# Patient Record
Sex: FEMALE | Race: OTHER
[De-identification: names, ages, dates, MRNs, and addresses within clinical notes are randomized per-mention and may not be internally consistent; named-entity substitution may affect disease eponyms.]

---

## 2019-07-19 ENCOUNTER — HOSPITAL ENCOUNTER (INPATIENT)
Dept: HOSPITAL 72 - EMR | Age: 49
LOS: 6 days | Discharge: HOME | DRG: 139 | End: 2019-07-25
Payer: COMMERCIAL

## 2019-07-19 VITALS — DIASTOLIC BLOOD PRESSURE: 83 MMHG | SYSTOLIC BLOOD PRESSURE: 116 MMHG

## 2019-07-19 VITALS — HEIGHT: 60 IN | BODY MASS INDEX: 27.68 KG/M2 | WEIGHT: 141 LBS

## 2019-07-19 VITALS — SYSTOLIC BLOOD PRESSURE: 125 MMHG | DIASTOLIC BLOOD PRESSURE: 86 MMHG

## 2019-07-19 VITALS — SYSTOLIC BLOOD PRESSURE: 113 MMHG | DIASTOLIC BLOOD PRESSURE: 68 MMHG

## 2019-07-19 DIAGNOSIS — R94.5: ICD-10-CM

## 2019-07-19 DIAGNOSIS — I21.4: ICD-10-CM

## 2019-07-19 DIAGNOSIS — J18.9: Primary | ICD-10-CM

## 2019-07-19 DIAGNOSIS — G81.94: ICD-10-CM

## 2019-07-19 DIAGNOSIS — A08.4: ICD-10-CM

## 2019-07-19 DIAGNOSIS — H54.8: ICD-10-CM

## 2019-07-19 DIAGNOSIS — R74.8: ICD-10-CM

## 2019-07-19 LAB
ADD MANUAL DIFF: NO
ALBUMIN SERPL-MCNC: 3.4 G/DL (ref 3.4–5)
ALBUMIN/GLOB SERPL: 0.6 {RATIO} (ref 1–2.7)
ALP SERPL-CCNC: 134 U/L (ref 46–116)
ALT SERPL-CCNC: 92 U/L (ref 12–78)
ANION GAP SERPL CALC-SCNC: 11 MMOL/L (ref 5–15)
APPEARANCE UR: CLEAR
APTT PPP: YELLOW S
AST SERPL-CCNC: 93 U/L (ref 15–37)
BASOPHILS NFR BLD AUTO: 1.7 % (ref 0–2)
BILIRUB SERPL-MCNC: 0.8 MG/DL (ref 0.2–1)
BUN SERPL-MCNC: 17 MG/DL (ref 7–18)
CALCIUM SERPL-MCNC: 9 MG/DL (ref 8.5–10.1)
CHLORIDE SERPL-SCNC: 104 MMOL/L (ref 98–107)
CK MB SERPL-MCNC: 0.8 NG/ML (ref 0–3.6)
CK SERPL-CCNC: 114 U/L (ref 26–308)
CO2 SERPL-SCNC: 26 MMOL/L (ref 21–32)
CREAT SERPL-MCNC: 0.6 MG/DL (ref 0.55–1.3)
EOSINOPHIL NFR BLD AUTO: 0.5 % (ref 0–3)
ERYTHROCYTE [DISTWIDTH] IN BLOOD BY AUTOMATED COUNT: 10.6 % (ref 11.6–14.8)
GLOBULIN SER-MCNC: 5.3 G/DL
GLUCOSE UR STRIP-MCNC: NEGATIVE MG/DL
HCT VFR BLD CALC: 50.5 % (ref 37–47)
HGB BLD-MCNC: 16.7 G/DL (ref 12–16)
KETONES UR QL STRIP: (no result)
LEUKOCYTE ESTERASE UR QL STRIP: (no result)
LYMPHOCYTES NFR BLD AUTO: 12.6 % (ref 20–45)
MCV RBC AUTO: 90 FL (ref 80–99)
MONOCYTES NFR BLD AUTO: 14.1 % (ref 1–10)
NEUTROPHILS NFR BLD AUTO: 71.1 % (ref 45–75)
NITRITE UR QL STRIP: NEGATIVE
PH UR STRIP: 7 [PH] (ref 4.5–8)
PLATELET # BLD: 287 K/UL (ref 150–450)
POTASSIUM SERPL-SCNC: 3.8 MMOL/L (ref 3.5–5.1)
PROT UR QL STRIP: (no result)
RBC # BLD AUTO: 5.59 M/UL (ref 4.2–5.4)
SODIUM SERPL-SCNC: 141 MMOL/L (ref 136–145)
SP GR UR STRIP: 1.01 (ref 1–1.03)
UROBILINOGEN UR-MCNC: 8 MG/DL (ref 0–1)
WBC # BLD AUTO: 3.9 K/UL (ref 4.8–10.8)

## 2019-07-19 PROCEDURE — 80076 HEPATIC FUNCTION PANEL: CPT

## 2019-07-19 PROCEDURE — 99285 EMERGENCY DEPT VISIT HI MDM: CPT

## 2019-07-19 PROCEDURE — 86703 HIV-1/HIV-2 1 RESULT ANTBDY: CPT

## 2019-07-19 PROCEDURE — 93017 CV STRESS TEST TRACING ONLY: CPT

## 2019-07-19 PROCEDURE — 84484 ASSAY OF TROPONIN QUANT: CPT

## 2019-07-19 PROCEDURE — 81003 URINALYSIS AUTO W/O SCOPE: CPT

## 2019-07-19 PROCEDURE — 96365 THER/PROPH/DIAG IV INF INIT: CPT

## 2019-07-19 PROCEDURE — 85007 BL SMEAR W/DIFF WBC COUNT: CPT

## 2019-07-19 PROCEDURE — 80202 ASSAY OF VANCOMYCIN: CPT

## 2019-07-19 PROCEDURE — 81025 URINE PREGNANCY TEST: CPT

## 2019-07-19 PROCEDURE — 80048 BASIC METABOLIC PNL TOTAL CA: CPT

## 2019-07-19 PROCEDURE — 93306 TTE W/DOPPLER COMPLETE: CPT

## 2019-07-19 PROCEDURE — 94664 DEMO&/EVAL PT USE INHALER: CPT

## 2019-07-19 PROCEDURE — 83690 ASSAY OF LIPASE: CPT

## 2019-07-19 PROCEDURE — 82550 ASSAY OF CK (CPK): CPT

## 2019-07-19 PROCEDURE — 85025 COMPLETE CBC W/AUTO DIFF WBC: CPT

## 2019-07-19 PROCEDURE — 36415 COLL VENOUS BLD VENIPUNCTURE: CPT

## 2019-07-19 PROCEDURE — 96375 TX/PRO/DX INJ NEW DRUG ADDON: CPT

## 2019-07-19 PROCEDURE — 94640 AIRWAY INHALATION TREATMENT: CPT

## 2019-07-19 PROCEDURE — 78452 HT MUSCLE IMAGE SPECT MULT: CPT

## 2019-07-19 PROCEDURE — 80069 RENAL FUNCTION PANEL: CPT

## 2019-07-19 PROCEDURE — 83880 ASSAY OF NATRIURETIC PEPTIDE: CPT

## 2019-07-19 PROCEDURE — 82553 CREATINE MB FRACTION: CPT

## 2019-07-19 PROCEDURE — 80053 COMPREHEN METABOLIC PANEL: CPT

## 2019-07-19 PROCEDURE — 93005 ELECTROCARDIOGRAM TRACING: CPT

## 2019-07-19 PROCEDURE — 71045 X-RAY EXAM CHEST 1 VIEW: CPT

## 2019-07-19 PROCEDURE — 74177 CT ABD & PELVIS W/CONTRAST: CPT

## 2019-07-19 RX ADMIN — SODIUM CHLORIDE SCH MLS/HR: 9 INJECTION, SOLUTION INTRAVENOUS at 21:54

## 2019-07-19 RX ADMIN — HEPARIN SODIUM SCH UNITS: 5000 INJECTION INTRAVENOUS; SUBCUTANEOUS at 21:58

## 2019-07-19 RX ADMIN — SODIUM CHLORIDE SCH MLS/HR: 0.9 INJECTION INTRAVENOUS at 21:55

## 2019-07-19 NOTE — CARDIOLOGY PROGRESS NOTE
Assessment/Plan


Assessment/Plan


The patient is seen and examined, full consult note is dictated.





Objective





Last 24 Hour Vital Signs








  Date Time  Temp Pulse Resp B/P (MAP) Pulse Ox O2 Delivery O2 Flow Rate FiO2


 


7/19/19 18:44 98.0 80 19 109/79 97 Nasal Cannula 1.0 24


 


7/19/19 17:45  95 25  100 Nasal Cannula  24


 


7/19/19 17:34        24


 


7/19/19 17:34  96 24  94 Nasal Cannula 1.0 24


 


7/19/19 17:34  96 24  94 Nasal Cannula 1.0 24


 


7/19/19 16:15 98.1 82 20 116/83 96 Room Air  


 


7/19/19 14:25 98.4 86 13 113/68 97 Room Air  


 


7/19/19 14:24  84 22   Room Air  


 


7/19/19 14:15 98.1 85 18 105/80 (88) 98 Room Air  











Laboratory Tests








Test


  7/19/19


14:46


 


White Blood Count


  3.9 K/UL


(4.8-10.8)  L


 


Red Blood Count


  5.59 M/UL


(4.20-5.40)  H


 


Hemoglobin


  16.7 G/DL


(12.0-16.0)  H


 


Hematocrit


  50.5 %


(37.0-47.0)  H


 


Mean Corpuscular Volume 90 FL (80-99)  


 


Mean Corpuscular Hemoglobin


  29.9 PG


(27.0-31.0)


 


Mean Corpuscular Hemoglobin


Concent 33.0 G/DL


(32.0-36.0)


 


Red Cell Distribution Width


  10.6 %


(11.6-14.8)  L


 


Platelet Count


  287 K/UL


(150-450)


 


Mean Platelet Volume


  7.0 FL


(6.5-10.1)


 


Neutrophils (%) (Auto)


  71.1 %


(45.0-75.0)


 


Lymphocytes (%) (Auto)


  12.6 %


(20.0-45.0)  L


 


Monocytes (%) (Auto)


  14.1 %


(1.0-10.0)  H


 


Eosinophils (%) (Auto)


  0.5 %


(0.0-3.0)


 


Basophils (%) (Auto)


  1.7 %


(0.0-2.0)


 


Urine Color Yellow  


 


Urine Appearance Clear  


 


Urine pH 7 (4.5-8.0)  


 


Urine Specific Gravity


  1.015


(1.005-1.035)


 


Urine Protein


  2+ (NEGATIVE)


H


 


Urine Glucose (UA)


  Negative


(NEGATIVE)


 


Urine Ketones


  3+ (NEGATIVE)


H


 


Urine Blood


  1+ (NEGATIVE)


H


 


Urine Nitrite


  Negative


(NEGATIVE)


 


Urine Bilirubin


  Negative


(NEGATIVE)


 


Urine Urobilinogen


  8 MG/DL


(0.0-1.0)  H


 


Urine Leukocyte Esterase


  1+ (NEGATIVE)


H


 


Urine RBC


  0-2 /HPF (0 -


2)


 


Urine WBC


  0-2 /HPF (0 -


2)


 


Urine Squamous Epithelial


Cells None /LPF


(NONE/OCC)


 


Urine Bacteria


  None /HPF


(NONE)


 


Urine HCG, Qualitative


  Negative


(NEGATIVE)


 


Sodium Level


  141 MMOL/L


(136-145)


 


Potassium Level


  3.8 MMOL/L


(3.5-5.1)


 


Chloride Level


  104 MMOL/L


()


 


Carbon Dioxide Level


  26 MMOL/L


(21-32)


 


Anion Gap


  11 mmol/L


(5-15)


 


Blood Urea Nitrogen


  17 mg/dL


(7-18)


 


Creatinine


  0.6 MG/DL


(0.55-1.30)


 


Estimat Glomerular Filtration


Rate > 60 mL/min


(>60)


 


Glucose Level


  103 MG/DL


()


 


Calcium Level


  9.0 MG/DL


(8.5-10.1)


 


Total Bilirubin


  0.8 MG/DL


(0.2-1.0)


 


Aspartate Amino Transf


(AST/SGOT) 93 U/L (15-37)


H


 


Alanine Aminotransferase


(ALT/SGPT) 92 U/L (12-78)


H


 


Alkaline Phosphatase


  134 U/L


()  H


 


Total Creatine Kinase


  114 U/L


()


 


Creatine Kinase MB


  0.8 NG/ML


(0.0-3.6)


 


Creatine Kinase MB Relative


Index 0.7  


 


 


Troponin I


  0.338 ng/mL


(0.000-0.056)


 


Pro-B-Type Natriuretic Peptide


  122 pg/mL


(0-125)


 


Total Protein


  8.7 G/DL


(6.4-8.2)  H


 


Albumin


  3.4 G/DL


(3.4-5.0)


 


Globulin 5.3 g/dL  


 


Albumin/Globulin Ratio


  0.6 (1.0-2.7)


L


 


Lipase


  89 U/L


()

















Asad Campoverde MD Jul 19, 2019 20:14

## 2019-07-19 NOTE — NUR
ED Nurse Note: 

ERMD notified that patient c/o hard time breathing at this time. Pulse oximetry 
reading > 94% with RR21 with HR 80. Reports no chest pain. Able to speak full 
sentence. ERMD ordered to provide  oxygen via N/C and placed patient in 
semi-mayen position.

## 2019-07-19 NOTE — NUR
CASE MANAGEMENT: REVIEW



49Y/F BIBA FROM HOME 



CC: ABD PAIN . N/V/D . SOB . COUGH





SI: ELEVATED TROPONIN 

T 98.0 HR 95 RR 25 /79 SAT 98% ROOM AIR

WBC 3.9  AST 93 ALT 92 ALK PHOS 134 TROPONIN I 0.338 





IS: NS IVF BOLUS X1

CEFTRIAXONE IV X1

MORPHINE IV X1

ASA PO X1

LASIX IV X1 





***PATIENT ADMITTED TO TELEMETRY UNIT 07/19/2019***

DCP: PATIENT IS FROM HOME

## 2019-07-19 NOTE — DIAGNOSTIC IMAGING REPORT
Clinical Indication: Abdominal pain

 

Technique:   No oral contrast utilized, per emergency room physician request  IV

administration nonionic contrast. Venous phase spiral acquisition obtained through

the abdomen and pelvis. Multiplanar reconstructions were generated. Total dose length

product 1045.46 mGycm. CTDIvol(s) 18.84 mGy. Dose reduction achieved using automated

exposure control

 

 

Comparison: none

 

Findings: Like of enteric contrast limits assessment of the GI tract. The appendix is

normal. There is no evidence of diverticulosis or diverticulitis. No small bowel

distention. No free or loculated intraperitoneal gas or fluid is evident. The distal

esophagus, stomach, duodenum are unremarkable.

 

The liver, gallbladder, bile ducts, pancreas, spleen, adrenals are unremarkable.

There is an accessory splenule. There is a subcentimeter low-attenuation lesion in

the upper pole of the left kidney which is too small to characterize, most likely

benign simple cyst or possibly an angiomyolipoma. Other very tiny scattered

subcentimeter low-attenuation lesions are also seen bilaterally. No renal or ureteral

calculi, hydronephrosis, or hydroureter. Uterus and ovaries are unremarkable,

although there may be a collapsed follicle in the left ovary. The endometrial stripe

is somewhat thickened, measuring 11 mm thick, and there is evidence of fluid in the

endometrium No pelvic mass or adenopathy. No retroperitoneal or mesenteric mass or

adenopathy.

 

The lung bases demonstrate scattered patchy parenchymal opacities bilaterally. The

bones demonstrate thoracolumbar dextroscoliotic deformity. There is a slight superior

endplate depression of the L1 vertebral body.

 

Impression: The visualized lung bases to the scattered patchy pulmonary parenchymal

infiltrates, most likely pneumonia

 

Limited assessment of the GI tract, due to lack of enteric contrast administration

 

No definite acute abdominal or pelvic process

 

Nonspecific mild endometrial thickening and fluid. Consider pelvic sonographic

correlation as clinically indicated

 

Possible collapsed left ovarian follicle

 

Mild superior endplate depression of the L1 vertebral body. Age indeterminate.

Consider MRI for better characterization if clinically relevant

 

Subcentimeter low-attenuation renal lesions, too small to characterize, most likely

benign simple cortical cysts. No further follow-up necessary.

 

Incidental findings of thoracolumbar dextroscoliotic deformity, accessory splenule.

 

The CT scanner at [facility] is accredited by the [employer] College of

[employer]iology and the scans are performed using protocols designed to limit radiation

exposure to as low as reasonably achievable to attain images of sufficient resolution

adequate for diagnostic evaluation.

## 2019-07-19 NOTE — NUR
ED Nurse Note:

PATIENT PRESENTS TO DUE TO ABDOMINAL PAIN, CHILLS, N/V AND LOOSE STOOL X 2 
DAYS; PATIENT STATES 'PAIN WAS ALL OVER THE ABDOMEN'; PATIENT HAS REDNESS WITH 
SCALY SKIN ON UPPER BACK, CHEST. PER PATIENT, SHE WAS SEEN BY MD DUE TO 
ALLERGIC REACTION 2 DAYS AGO. PATIENT HAS SCALY SKIN OVER RIGHT HAND. PATIENT 
WAS UNABLE TO TOLERATE ORAL FLUIDS DUE TO FEELING NAUSEOUS AND VOMITIED AFTER 
DRINKING FLUIDS OR EATING FOOD. PATIENT AMBULATES WITH CANE AT HOME.

## 2019-07-19 NOTE — EMERGENCY ROOM REPORT
History of Present Illness


General


Chief Complaint:  Abdominal Pain


Source:  Patient





Present Illness


HPI


Patient presents with complaints of diffuse abdominal pain


Diarrhea was started 2 days ago


Denies any headache denies any chest pain patient is legally blind has left-

sided paralysis





Denies any chest pain or shortness of breath abdominal pains 5 out of 10 sharp 

denies any change of position denies any blood in the stool


Allergies:  


Coded Allergies:  


     No Known Allergies (Unverified , 7/19/19)





Patient History


Past Medical History:  see triage record


Pertinent Family History:  none


Last Menstrual Period:  06/13/2019


Reviewed Nursing Documentation:  PMH: Agreed; PSxH: Agreed





Nursing Documentation-PMH


Past Medical History:  No History, Except For





Review of Systems


All Other Systems:  negative except mentioned in HPI





Physical Exam





Vital Signs








  Date Time  Temp Pulse Resp B/P (MAP) Pulse Ox O2 Delivery O2 Flow Rate FiO2


 


7/19/19 14:15 98.1 85 18 105/80 (88) 98 Room Air  








Sp02 EP Interpretation:  reviewed, normal


General Appearance:  well appearing, no apparent distress


Head:  normocephalic, atraumatic


ENT:  normal pharynx


Neck:  supple


Respiratory:  lungs clear, no retraction, no accessory muscle use


Cardiovascular #1:  regular rate, rhythm


Gastrointestinal:  other - Patient is uncomfortable diffusely however no 

obvious rebound soft abdomen


Musculoskeletal:  other - Patient debilitated with left hand flexure bilateral 

feet and extension


Neurologic:  alert, oriented x3


Skin:  other - Rash involving the upper chest bilaterally patient reports that 

this is reaction to mustard


Lymphatic:  no adenopathy





Medical Decision Making


Diagnostic Impression:  


 Primary Impression:  


 Abdominal pain


 Additional Impression:  


 Elevated troponin


ER Course


With the history exam and presentation, multiple differentials considered, 

including but not limited to appendicitis, gastritis, cholecystitis, 

diverticulitis





During the patient's work-up and examination cardiac enzyme has returned mildly 

elevated patient CT imaging of the abdomen also shows incidental finding of 

bilateral lower lobe atelectasis





Further question regarding cardiac and cardiopulmonary pathology was also 

entertained at this time, patient received further intervention in the ER





And requires inpatient care





Labs








Test


  7/19/19


14:46 7/19/19


23:00 7/20/19


05:52 7/21/19


09:57


 


White Blood Count


  3.9 K/UL


(4.8-10.8) 


  3.3 K/UL


(4.8-10.8) 3.7 K/UL


(4.8-10.8)


 


Red Blood Count


  5.59 M/UL


(4.20-5.40) 


  4.78 M/UL


(4.20-5.40) 4.86 M/UL


(4.20-5.40)


 


Hemoglobin


  16.7 G/DL


(12.0-16.0) 


  14.6 G/DL


(12.0-16.0) 14.4 G/DL


(12.0-16.0)


 


Hematocrit


  50.5 %


(37.0-47.0) 


  43.2 %


(37.0-47.0) 43.6 %


(37.0-47.0)


 


Mean Corpuscular Volume 90 FL (80-99)   90 FL (80-99)  90 FL (80-99) 


 


Mean Corpuscular Hemoglobin


  29.9 PG


(27.0-31.0) 


  30.6 PG


(27.0-31.0) 29.7 PG


(27.0-31.0)


 


Mean Corpuscular Hemoglobin


Concent 33.0 G/DL


(32.0-36.0) 


  33.8 G/DL


(32.0-36.0) 33.1 G/DL


(32.0-36.0)


 


Red Cell Distribution Width


  10.6 %


(11.6-14.8) 


  10.5 %


(11.6-14.8) 10.5 %


(11.6-14.8)


 


Platelet Count


  287 K/UL


(150-450) 


  250 K/UL


(150-450) 257 K/UL


(150-450)


 


Mean Platelet Volume


  7.0 FL


(6.5-10.1) 


  6.4 FL


(6.5-10.1) 6.5 FL


(6.5-10.1)


 


Neutrophils (%) (Auto)


  71.1 %


(45.0-75.0) 


   % (45.0-75.0) 


   % (45.0-75.0) 


 


 


Lymphocytes (%) (Auto)


  12.6 %


(20.0-45.0) 


   % (20.0-45.0) 


   % (20.0-45.0) 


 


 


Monocytes (%) (Auto)


  14.1 %


(1.0-10.0) 


   % (1.0-10.0) 


   % (1.0-10.0) 


 


 


Eosinophils (%) (Auto)


  0.5 %


(0.0-3.0) 


   % (0.0-3.0) 


   % (0.0-3.0) 


 


 


Basophils (%) (Auto)


  1.7 %


(0.0-2.0) 


   % (0.0-2.0) 


   % (0.0-2.0) 


 


 


Urine Color Yellow    


 


Urine Appearance Clear    


 


Urine pH 7 (4.5-8.0)    


 


Urine Specific Gravity


  1.015


(1.005-1.035) 


  


  


 


 


Urine Protein 2+ (NEGATIVE)    


 


Urine Glucose (UA)


  Negative


(NEGATIVE) 


  


  


 


 


Urine Ketones 3+ (NEGATIVE)    


 


Urine Blood 1+ (NEGATIVE)    


 


Urine Nitrite


  Negative


(NEGATIVE) 


  


  


 


 


Urine Bilirubin


  Negative


(NEGATIVE) 


  


  


 


 


Urine Urobilinogen


  8 MG/DL


(0.0-1.0) 


  


  


 


 


Urine Leukocyte Esterase 1+ (NEGATIVE)    


 


Urine RBC


  0-2 /HPF (0 -


2) 


  


  


 


 


Urine WBC


  0-2 /HPF (0 -


2) 


  


  


 


 


Urine Squamous Epithelial


Cells None /LPF


(NONE/OCC) 


  


  


 


 


Urine Bacteria


  None /HPF


(NONE) 


  


  


 


 


Urine HCG, Qualitative


  Negative


(NEGATIVE) 


  


  


 


 


Sodium Level


  141 MMOL/L


(136-145) 


  139 MMOL/L


(136-145) 137 MMOL/L


(136-145)


 


Potassium Level


  3.8 MMOL/L


(3.5-5.1) 


  3.1 MMOL/L


(3.5-5.1) 3.5 MMOL/L


(3.5-5.1)


 


Chloride Level


  104 MMOL/L


() 


  103 MMOL/L


() 103 MMOL/L


()


 


Carbon Dioxide Level


  26 MMOL/L


(21-32) 


  25 MMOL/L


(21-32) 25 MMOL/L


(21-32)


 


Anion Gap


  11 mmol/L


(5-15) 


  11 mmol/L


(5-15) 9 mmol/L


(5-15)


 


Blood Urea Nitrogen


  17 mg/dL


(7-18) 


  17 mg/dL


(7-18) 5 mg/dL (7-18) 


 


 


Creatinine


  0.6 MG/DL


(0.55-1.30) 


  0.6 MG/DL


(0.55-1.30) 0.5 MG/DL


(0.55-1.30)


 


Estimat Glomerular Filtration


Rate > 60 mL/min


(>60) 


  > 60 mL/min


(>60) > 60 mL/min


(>60)


 


Glucose Level


  103 MG/DL


() 


  97 MG/DL


() 100 MG/DL


()


 


Calcium Level


  9.0 MG/DL


(8.5-10.1) 


  8.4 MG/DL


(8.5-10.1) 8.3 MG/DL


(8.5-10.1)


 


Total Bilirubin


  0.8 MG/DL


(0.2-1.0) 


  


  


 


 


Aspartate Amino Transf


(AST/SGOT) 93 U/L (15-37) 


  


  


  


 


 


Alanine Aminotransferase


(ALT/SGPT) 92 U/L (12-78) 


  


  


  


 


 


Alkaline Phosphatase


  134 U/L


() 


  


  


 


 


Total Creatine Kinase


  114 U/L


() 


  


  


 


 


Creatine Kinase MB


  0.8 NG/ML


(0.0-3.6) 


  


  


 


 


Creatine Kinase MB Relative


Index 0.7 


  


  


  


 


 


Troponin I


  0.338 ng/mL


(0.000-0.056) 0.520 ng/mL


(0.000-0.056) 


  0.727 ng/mL


(0.000-0.056)


 


Pro-B-Type Natriuretic Peptide


  122 pg/mL


(0-125) 


  


  


 


 


Total Protein


  8.7 G/DL


(6.4-8.2) 


  


  


 


 


Albumin


  3.4 G/DL


(3.4-5.0) 


  3.2 G/DL


(3.4-5.0) 


 


 


Globulin 5.3 g/dL    


 


Albumin/Globulin Ratio 0.6 (1.0-2.7)    


 


Lipase


  89 U/L


() 


  


  


 


 


Differential Total Cells


Counted 


  


  100 


  100 


 


 


Neutrophils % (Manual)   64 % (45-75)  75 % (45-75) 


 


Lymphocytes % (Manual)   15 % (20-45)  12 % (20-45) 


 


Monocytes % (Manual)   19 % (1-10)  13 % (1-10) 


 


Eosinophils % (Manual)   1 % (0-3)  0 % (0-3) 


 


Basophils % (Manual)   1 % (0-2)  0 % (0-2) 


 


Band Neutrophils   0 % (0-8)  0 % (0-8) 


 


Platelet Estimate   Adequate  Adequate 


 


Platelet Morphology   Normal  Normal 


 


Red Blood Cell Morphology   Normal  Normal 


 


Phosphorus Level


  


  


  3.3 MG/DL


(2.5-4.9) 


 


 


Vancomycin Level Trough


  


  


  


  5.7 ug/mL


(5.0-12.0)


 


HIV (1&2) Antibody Rapid


  


  


  


  Negative


(NEGATIVE)


 


Test


  7/22/19


04:35 


  


  


 


 


White Blood Count


  3.7 K/UL


(4.8-10.8) 


  


  


 


 


Red Blood Count


  4.85 M/UL


(4.20-5.40) 


  


  


 


 


Hemoglobin


  14.7 G/DL


(12.0-16.0) 


  


  


 


 


Hematocrit


  43.4 %


(37.0-47.0) 


  


  


 


 


Mean Corpuscular Volume 89 FL (80-99)    


 


Mean Corpuscular Hemoglobin


  30.3 PG


(27.0-31.0) 


  


  


 


 


Mean Corpuscular Hemoglobin


Concent 33.9 G/DL


(32.0-36.0) 


  


  


 


 


Red Cell Distribution Width


  10.1 %


(11.6-14.8) 


  


  


 


 


Platelet Count


  257 K/UL


(150-450) 


  


  


 


 


Mean Platelet Volume


  7.7 FL


(6.5-10.1) 


  


  


 


 


Neutrophils (%) (Auto)


  63.8 %


(45.0-75.0) 


  


  


 


 


Lymphocytes (%) (Auto)


  15.6 %


(20.0-45.0) 


  


  


 


 


Monocytes (%) (Auto)


  18.4 %


(1.0-10.0) 


  


  


 


 


Eosinophils (%) (Auto)


  1.4 %


(0.0-3.0) 


  


  


 


 


Basophils (%) (Auto)


  0.8 %


(0.0-2.0) 


  


  


 


 


Sodium Level


  138 MMOL/L


(136-145) 


  


  


 


 


Potassium Level


  3.2 MMOL/L


(3.5-5.1) 


  


  


 


 


Chloride Level


  103 MMOL/L


() 


  


  


 


 


Carbon Dioxide Level


  25 MMOL/L


(21-32) 


  


  


 


 


Anion Gap


  10 mmol/L


(5-15) 


  


  


 


 


Blood Urea Nitrogen 7 mg/dL (7-18)    


 


Creatinine


  0.6 MG/DL


(0.55-1.30) 


  


  


 


 


Estimat Glomerular Filtration


Rate > 60 mL/min


(>60) 


  


  


 


 


Glucose Level


  102 MG/DL


() 


  


  


 


 


Calcium Level


  8.6 MG/DL


(8.5-10.1) 


  


  


 








Rhythm Strip Diag. Results


EP Interpretation:  yes


Rate:  88


Rhythm:  NSR, no PVC's, no ectopy





Chest X-Ray Diagnostic Results


Chest X-Ray Diagnostic Results :  


   Chest X-Ray Ordered:  Yes


   # of Views/Limited/Complete:  1 View


   Indication:  Chest Pain


   EP Interpretation:  Yes


   Interpretation:  no consolidation, no effusion, no pneumothorax


   Impression:  No acute disease


   Electronically Signed by:  Vinita Sanchez DO





CT/MRI/US Diagnostic Results


CT/MRI/US Diagnostic Results :  


   Impression


CT abdomen pelvisImpression: The visualized lung bases to the scattered patchy 

pulmonary parenchymal


infiltrates, most likely pneumonia


 


Limited assessment of the GI tract, due to lack of enteric contrast 

administration


 


No definite acute abdominal or pelvic process


 


Nonspecific mild endometrial thickening and fluid. Consider pelvic sonographic


correlation as clinically indicated


 


Possible collapsed left ovarian follicle


 


Mild superior endplate depression of the L1 vertebral body. Age indeterminate.


Consider MRI for better characterization if clinically relevant


 


Subcentimeter low-attenuation renal lesions, too small to characterize, most 

likely


benign simple cortical cysts. No further follow-up necessary.


 


Incidental findings of thoracolumbar dextroscoliotic deformity, accessory 

splenule.





Last Vital Signs








  Date Time  Temp Pulse Resp B/P (MAP) Pulse Ox O2 Delivery O2 Flow Rate FiO2


 


7/19/19 14:25 98.4 86 13 113/68 97 Room Air  








Status:  improved


Disposition:  ADMITTED AS INPATIENT


Condition:  Serious


Referrals:  


NON PHYSICIAN (PCP)











Vinita Sanchez DO Jul 19, 2019 15:54

## 2019-07-19 NOTE — NUR
ED Nurse Note:

Patient taken down for CT scan. RN read the consent form and patient verbalized 
understanding of it and signed the consent.

## 2019-07-19 NOTE — CONSULTATION
DATE OF CONSULTATION:  07/19/2019

CARDIOLOGY CONSULTATION



CONSULTING PHYSICIAN:  Asad Campoverde M.D.



REFERRING PHYSICIAN:  Edwin Collins D.O.



REASON FOR CONSULTATION:  Management of elevated troponin I level.



HISTORY OF PRESENT ILLNESS:  The patient is a very unfortunate 49-year-old

female, who was recently discharged from an outside hospital for food

poisoning, presents today to this facility with diffuse abdominal pain

with associated diarrhea, severe nausea, and inability to tolerate p.o.



The patient is legally blind and has left-sided paralysis.



 She also states that she has trouble with chest pain.  At the time of

arrival to this facility, blood pressure was 105/88 and pulse was 85.

Laboratory finding revealed evidence of leukopenia as well as elevated

troponin I level at 0.033.  Both AST and ALT were elevated at 93 and 92.

The patient was admitted to telemetry for further evaluation and

management.  Cardiology consultation was made at request of Dr. Edwin Collins.



PAST MEDICAL HISTORY:

1. Blindness, bilateral.

2.  Left upper extremity paralysis.



ALLERGIES:  No known drug allergies.



FAMILY HISTORY:  No premature coronary artery disease in first-degree

relatives.



PAST SURGICAL HISTORY:  None.



SOCIAL HISTORY:  Denies any tobacco, alcohol, or illicit drug use.



REVIEW OF SYSTEMS:  A 12-system review done essentially negative except

what was mentioned in history of present illness.



MEDICATIONS:  List of medication includes loratadine 5 mg p.o.

daily.



PHYSICAL EXAMINATION:  

Vital signs: /80, pulse of 85, respirations 18, temperature 98.1 degrees 
Fahrenheit, and O2 saturation

98% on room air.

HEENT: Atraumatic, anicteric, PERRLA, EOMI, B/L Blindness

NECK: JVP <5 cm, No carotid bruit, carotid upstroke 2+ B/L

CVS: Normal S1S2, No murmurs, gallops or rubs, RRR

LUNGS: Clear to auscultation.

ABDOMEN: Soft non-tender, non-distended, no hepatosplenomegaly, +BS

EXT: No evidence od edema, clubbing or cyanosis.



ASSESSMENT AND PLAN:  The patient is a very unfortunate 49-year-old female,

who seen in Cardiology consultation.



1. Elevated troponin I level in this patient.  I would like to order a

12-lead electrocardiogram to review the rhythm and possible ST and T-wave

changes.  The chart does not contain 12-lead electrocardiogram and

computer does not provide 12-lead electrocardiogram, either.  We will

obtain 2D echocardiography for assessment of LV systolic and diastolic

function.

2. I ordered another troponin I level stat to see the trend of the

troponin I level.  The patient is currently complaining of abdominal pain,

nausea, and vomiting.

3.  Legally blind.

4. History of food poisoning.

5. History of left upper extremity paralysis.



I would like to thank Dr. Collins for allowing me to participate in care

of this patient.









  ______________________________________________

  Aasd Campoverde M.D.





DR:  LILY

D:  07/19/2019 20:31

T:  07/19/2019 20:50

JOB#:  [id number]

CC:



ALIYA

## 2019-07-19 NOTE — NUR
NURSE NOTES: Report received from GLORY Matthews. Pt is in stable condition resting comfortably 
in bed. Bed in the lowest position, bed brakes engaged, side rails up x3 and call light 
within reach. Will continue to monitor.

## 2019-07-19 NOTE — DIAGNOSTIC IMAGING REPORT
Indication: Shortness of breath

 

Technique: One view of the chest

 

Comparison: none

 

Findings: Body habitus somewhat somewhat limits evaluation. Hazy opacity of the left

lung base is probably due to overlying soft tissues. No definite acute infiltrates,

effusions, or congestion. Heart size is normal

 

Impression: No definite acute process

## 2019-07-19 NOTE — NUR
HAND-OFF: 

Report received from Adebayo gordon RN and report given to Kayleigh HOLDER. Patient is transferred 
from ER to [facility]2 on a gurney without any incident. Vital signs are as follows: BP: 
104/82, HR: 82 on SR, O2: 98% 2L NC, T: 97.9. Patient is not complaining of pain at this 
time. Cardiac monitor initiated and patient is on SR. Belongings list checked and signed 
with ER RN. Bed is in lowest position with side rails upx2 and brakes are engaged. Bed alarm 
is on. Encouraged patient to use call light when in need of assistance, pt verbalized 
understanding. Will endorse plan of care.

## 2019-07-20 VITALS — SYSTOLIC BLOOD PRESSURE: 105 MMHG | DIASTOLIC BLOOD PRESSURE: 77 MMHG

## 2019-07-20 VITALS — DIASTOLIC BLOOD PRESSURE: 71 MMHG | SYSTOLIC BLOOD PRESSURE: 113 MMHG

## 2019-07-20 VITALS — DIASTOLIC BLOOD PRESSURE: 79 MMHG | SYSTOLIC BLOOD PRESSURE: 117 MMHG

## 2019-07-20 VITALS — SYSTOLIC BLOOD PRESSURE: 114 MMHG | DIASTOLIC BLOOD PRESSURE: 94 MMHG

## 2019-07-20 VITALS — DIASTOLIC BLOOD PRESSURE: 71 MMHG | SYSTOLIC BLOOD PRESSURE: 102 MMHG

## 2019-07-20 VITALS — SYSTOLIC BLOOD PRESSURE: 138 MMHG | DIASTOLIC BLOOD PRESSURE: 75 MMHG

## 2019-07-20 LAB
ADD MANUAL DIFF: YES
ALBUMIN SERPL-MCNC: 3.2 G/DL (ref 3.4–5)
ANION GAP SERPL CALC-SCNC: 11 MMOL/L (ref 5–15)
BUN SERPL-MCNC: 17 MG/DL (ref 7–18)
CALCIUM SERPL-MCNC: 8.4 MG/DL (ref 8.5–10.1)
CHLORIDE SERPL-SCNC: 103 MMOL/L (ref 98–107)
CO2 SERPL-SCNC: 25 MMOL/L (ref 21–32)
CREAT SERPL-MCNC: 0.6 MG/DL (ref 0.55–1.3)
ERYTHROCYTE [DISTWIDTH] IN BLOOD BY AUTOMATED COUNT: 10.5 % (ref 11.6–14.8)
HCT VFR BLD CALC: 43.2 % (ref 37–47)
HGB BLD-MCNC: 14.6 G/DL (ref 12–16)
MCV RBC AUTO: 90 FL (ref 80–99)
PHOSPHATE SERPL-MCNC: 3.3 MG/DL (ref 2.5–4.9)
PLATELET # BLD: 250 K/UL (ref 150–450)
POTASSIUM SERPL-SCNC: 3.1 MMOL/L (ref 3.5–5.1)
RBC # BLD AUTO: 4.78 M/UL (ref 4.2–5.4)
SODIUM SERPL-SCNC: 139 MMOL/L (ref 136–145)
WBC # BLD AUTO: 3.3 K/UL (ref 4.8–10.8)

## 2019-07-20 RX ADMIN — SODIUM CHLORIDE SCH MLS/HR: 9 INJECTION, SOLUTION INTRAVENOUS at 10:17

## 2019-07-20 RX ADMIN — SODIUM CHLORIDE SCH MLS/HR: 900 INJECTION, SOLUTION INTRAVENOUS at 15:46

## 2019-07-20 RX ADMIN — HEPARIN SODIUM SCH UNITS: 5000 INJECTION INTRAVENOUS; SUBCUTANEOUS at 20:14

## 2019-07-20 RX ADMIN — SODIUM CHLORIDE SCH MLS/HR: 9 INJECTION, SOLUTION INTRAVENOUS at 21:06

## 2019-07-20 RX ADMIN — SODIUM CHLORIDE SCH MLS/HR: 0.9 INJECTION INTRAVENOUS at 20:13

## 2019-07-20 RX ADMIN — SODIUM CHLORIDE SCH MLS/HR: 900 INJECTION, SOLUTION INTRAVENOUS at 12:03

## 2019-07-20 RX ADMIN — SODIUM CHLORIDE SCH MLS/HR: 0.9 INJECTION INTRAVENOUS at 08:44

## 2019-07-20 RX ADMIN — HEPARIN SODIUM SCH UNITS: 5000 INJECTION INTRAVENOUS; SUBCUTANEOUS at 08:50

## 2019-07-20 NOTE — NUR
NURSE NOTES:

Pt report received from Sunni HOLDER TELE. pt is alert and oriented times 4 and able to follow 
commands. Pt has cardiac monitor attached, able to show NSR on the monitor. no acute signs 
symptoms of cardiac distress noted. Pt is also on 2L NC able to sat at 100%, no signs 
symptoms of acute resp distress noted. all saftey precautions active, bed locked and low, 
bed armed, call light is within easy reach. will continue plan of care.

## 2019-07-20 NOTE — HISTORY AND PHYSICAL REPORT
DATE OF ADMISSION:  2019

DATE AND TIME SEEN:  On  at 12 noon.



CONSULTANTS:

1. Ramesh Luna M.D.

2. Naif Landry M.D.

3. Gerald Aguilar M.D.

4. Asad Campoverde M.D.



CHIEF COMPLAINT:  Abdominal pain, nausea, vomiting, elevated troponin,

pneumonia.



BRIEF HISTORY:  This is a 49-year-old female, who lives at home, today

presented with increased abdominal pain, slight nausea, and vomiting over

the past 2 days with slightly short of breath, came to [place], diagnosed

with the above as well as pneumonia and elevated troponin of 0.3, and

admitted to telemetry for further care.  Currently, O2 NC, slight short of

breath in bed, no complaint.



REVIEW OF SYSTEMS:  No chest pain.  Slight shortness of breath.  Slight

nausea and vomiting.



PAST MEDICAL HISTORY:  Fall with left hand contracture.



PAST SURGICAL HISTORY:  .



ALLERGIES:  Denies.



MEDICATIONS:  Include potassium, vancomycin, cefepime, morphine, Tylenol,

Zofran, ipratropium, furosemide, ceftriaxone.



SOCIAL HISTORY:  No smoking.  No alcohol.  No intravenous drug

abuse.



FAMILY HISTORY:  Noncontributory.



PHYSICAL EXAMINATION:

GENERAL:  Calm in bed, O2 NC, slight short of breath.  No

complaint.

VITAL SIGNS:  Temperature 98 degrees, pulse 83, respirations 17, blood

pressure 130/75.

CARDIOVASCULAR:  No murmur.

LUNGS:  Distant and clear.

ABDOMEN:  Bowel sounds positive.  Nontender.  Nondistended.

EXTREMITIES:  No cyanosis, clubbing, or edema.  Left hand contracture

noted.

NEUROLOGIC:  The patient moves all extremities, slightly weak.



LABORATORY AND DIAGNOSTIC DATA:  Labs at this time show white count 3.3,

otherwise CBC is normal.  BMP shows potassium 3.1.  Troponin 0.33 and then

0.52.



ASSESSMENT:  Abdominal pain, pneumonia, elevated troponin, slight nausea

and vomiting.



PLAN:

1. Troponin q.8 h. x3.

2. EKG in the morning.

3. Pain control.

4. Cardiology followup.

5. GI followup.

6. Antibiotics per Infectious Disease.

7. PT, OT, dietary evaluation.









  ______________________________________________

  Edwin Collins D.O.





DR:  Chrissy

D:  2019 12:41

T:  2019 15:49

JOB#:  [id number]

CC:

## 2019-07-20 NOTE — NUR
NURSE NOTES:

Received report from Kayleigh/RN, Patient is awake, Denies any pain at this time. Patient is 
able to make needs known. Checked IV, Patent, no bleeding or infiltration noted at this 
time. Bed in lowest position and locked, Call light within reach. All personal belonging 
within reach. Will continue plan of care.

## 2019-07-20 NOTE — NUR
HAND-OFF: 

Report given to Shanda, Patient is awake and alert, No acute distress noted. Family at 
bedside. Endorsed plan of care.


-------------------------------------------------------------------------------

Addendum: 07/20/19 at 1936 by Sunni Landa RN

-------------------------------------------------------------------------------

Report given to Deshaun

## 2019-07-20 NOTE — CONSULTATION
History of Present Illness


General


Date patient seen:  Jul 20, 2019


Chief Complaint:  Abdominal Pain





Present Illness


HPI


48 yo F with hx of legally blindness, L side paralysis, presented to ED on 7/19/ 19 with diffuse abd pain, diarrhea, nausea and inability to tolerate PO. OF 

note patient was recently discharge from outside hospital for food poisoning. 

Pain is described as sharp, 5/10 intensity





Denied HA, CP, SOB, melena, hematochezia


Allergies:  


Coded Allergies:  


     No Known Allergies (Unverified , 7/19/19)





Medication History


Scheduled


No Known Medications* (NKM - No Known Medications*), 0 ., (Reported)





Miscellaneous Medications


Loratadine (Loratadine), 5 MG PO, (Reported)





Patient History


Healthcare decision maker


N


Resuscitation status


Full Code


Advanced Directive on File





Patient History Narrative








Pmhx: as above





Shx:  Denies any tobacco, alcohol, or illicit drug use.





Fhx: non contributory





Review of Systems


All Other Systems:  negative except mentioned in HPI





Physical Exam


Physical Exam Narrative





GENERAL:  Calm in bed, O2 NC, slight short of breath.  No


complaint.


CARDIOVASCULAR:  No murmur.


LUNGS:  Distant and clear.


ABDOMEN:  Bowel sounds positive.  Nontender.  Nondistended.


EXTREMITIES:  No cyanosis, clubbing, or edema.  Left hand contracture


noted.


NEUROLOGIC:  The patient moves all extremities, slightly weak.





Last 24 Hour Vital Signs








  Date Time  Temp Pulse Resp B/P (MAP) Pulse Ox O2 Delivery O2 Flow Rate FiO2


 


7/20/19 16:00  76      


 


7/20/19 16:00 98.1 70 17 114/94 (101) 98   


 


7/20/19 15:17      Nasal Cannula 2.0 


 


7/20/19 12:00  71      


 


7/20/19 12:00 97.8 82 17 113/71 (85) 99   


 


7/20/19 09:00      Nasal Cannula 2.0 


 


7/20/19 08:00 98.2 83 17 138/75 (96) 98   


 


7/20/19 08:00  73      


 


7/20/19 04:00  70      


 


7/20/19 04:00 98.1 78 16 105/77 (86) 96   


 


7/20/19 00:00  80      


 


7/20/19 00:00 98.1 83 18 117/79 (92) 99   

















Intake and Output  


 


 7/19/19 7/20/19





 19:00 07:00


 


Intake Total 675 ml 


 


Output Total 415 ml 


 


Balance 260 ml 


 


  


 


Intake Oral 120 ml 


 


IV Total 555 ml 


 


Output Urine Total 415 ml 


 


# Voids  1











Laboratory Tests








Test


  7/19/19


23:00 7/20/19


05:52


 


Troponin I


  0.520 ng/mL


(0.000-0.056) 


 


 


White Blood Count


  


  3.3 K/UL


(4.8-10.8)  L


 


Red Blood Count


  


  4.78 M/UL


(4.20-5.40)


 


Hemoglobin


  


  14.6 G/DL


(12.0-16.0)


 


Hematocrit


  


  43.2 %


(37.0-47.0)


 


Mean Corpuscular Volume  90 FL (80-99)  


 


Mean Corpuscular Hemoglobin


  


  30.6 PG


(27.0-31.0)


 


Mean Corpuscular Hemoglobin


Concent 


  33.8 G/DL


(32.0-36.0)


 


Red Cell Distribution Width


  


  10.5 %


(11.6-14.8)  L


 


Platelet Count


  


  250 K/UL


(150-450)


 


Mean Platelet Volume


  


  6.4 FL


(6.5-10.1)  L


 


Neutrophils (%) (Auto)


  


  % (45.0-75.0)


 


 


Lymphocytes (%) (Auto)


  


  % (20.0-45.0)


 


 


Monocytes (%) (Auto)   % (1.0-10.0)  


 


Eosinophils (%) (Auto)   % (0.0-3.0)  


 


Basophils (%) (Auto)   % (0.0-2.0)  


 


Differential Total Cells


Counted 


  100  


 


 


Neutrophils % (Manual)  64 % (45-75)  


 


Lymphocytes % (Manual)  15 % (20-45)  L


 


Monocytes % (Manual)  19 % (1-10)  H


 


Eosinophils % (Manual)  1 % (0-3)  


 


Basophils % (Manual)  1 % (0-2)  


 


Band Neutrophils  0 % (0-8)  


 


Platelet Estimate  Adequate  


 


Platelet Morphology  Normal  


 


Red Blood Cell Morphology  Normal  


 


Sodium Level


  


  139 MMOL/L


(136-145)


 


Potassium Level


  


  3.1 MMOL/L


(3.5-5.1)  L


 


Chloride Level


  


  103 MMOL/L


()


 


Carbon Dioxide Level


  


  25 MMOL/L


(21-32)


 


Anion Gap


  


  11 mmol/L


(5-15)


 


Blood Urea Nitrogen


  


  17 mg/dL


(7-18)


 


Creatinine


  


  0.6 MG/DL


(0.55-1.30)


 


Estimat Glomerular Filtration


Rate 


  > 60 mL/min


(>60)


 


Glucose Level


  


  97 MG/DL


()


 


Calcium Level


  


  8.4 MG/DL


(8.5-10.1)  L


 


Phosphorus Level


  


  3.3 MG/DL


(2.5-4.9)


 


Albumin


  


  3.2 G/DL


(3.4-5.0)  L








Height (Feet):  5


Height (Inches):  0.00


Weight (Pounds):  140


Medications





Current Medications








 Medications


  (Trade)  Dose


 Ordered  Sig/Rosalio


 Route


 PRN Reason  Start Time


 Stop Time Status Last Admin


Dose Admin


 


 Acetaminophen


  (Tylenol)  650 mg  Q4H  PRN


 ORAL


 FEVER  7/19/19 17:48


 8/18/19 17:47   


 


 


 Albuterol/


 Ipratropium


  (Albuterol/


 Ipratropium)  3 ml  Q4H  PRN


 HHN


 Shortness of Breath  7/19/19 17:45


 7/24/19 17:44   


 


 


 Cefepime HCl 2 gm/


 Dextrose  110 ml @ 


 220 mls/hr  EVERY 12  HOURS


 IV


   7/19/19 21:00


 7/26/19 20:59  7/20/19 20:13


 


 


 Dextrose


  (Dextrose 50%)  25 ml  Q30M  PRN


 IV


 Hypoglycemia  7/19/19 17:45


 8/18/19 17:44   


 


 


 Dextrose


  (Dextrose 50%)  50 ml  Q30M  PRN


 IV


 Hypoglycemia  7/19/19 17:45


 8/18/19 17:44   


 


 


 Heparin Sodium


  (Porcine)


  (Heparin 5000


 units/ml)  5,000 units  EVERY 12  HOURS


 SUBQ


   7/19/19 21:00


 8/18/19 20:59  7/20/19 20:14


 


 


 Iopamidol


  (Isovue-300


 100ml)  100 ml  NOW  PRN


 INJ


 Radiology Procedure  7/19/19 14:30


     


 


 


 Morphine Sulfate


  (Morphine


 Sulfate)  2 mg  Q4H  PRN


 IVP


 Severe Pain (Pain Scale 7-10)  7/19/19 17:48


 7/26/19 17:47   


 


 


 Ondansetron HCl


  (Zofran)  4 mg  Q6H  PRN


 IVP


 Nausea & Vomiting  7/19/19 17:45


 8/18/19 17:44  7/20/19 02:39


 


 


 Polyethylene


 Glycol


  (Miralax)  17 gm  DAILYPRN  PRN


 ORAL


 Constipation  7/19/19 17:45


 8/18/19 17:44   


 


 


 Vancomycin HCl


  (Vanco rx to


 dose)  1 ea  DAILY  PRN


 MISC


 .  7/19/19 18:00


 8/18/19 17:59   


 


 


 Vancomycin HCl


 750 mg/Sodium


 Chloride  275 ml @ 


 183.333


 mls/hr  Q12HR@1000,2200


 IVPB


   7/19/19 22:00


 7/24/19 21:59  7/20/19 10:17


 











Assessment/Plan


Assessment/Plan:


Abx:


Ceftriaxone x1 7/19


IV Vancomycin 7/19


Cefepime 7/19-





Assessment:


Abd pain, Nausea


 Recent food poisoning


PNA (+cough)


  -CT abd/p: The visualized lung bases to the scattered patchy pulmonary 

parenchymal infiltrates, most likely pneumonia. Limited assessment of the GI 

tract, due to lack of enteric contrast administration. No definite acute 

abdominal or pelvic process. Nonspecific mild endometrial thickening and fluid. 

Consider pelvic sonographic correlation as clinically indicated


 Possible collapsed left ovarian follicle. Mild superior endplate depression of 

the L1 vertebral body. Age indeterminate. Consider MRI for better 

characterization if clinically relevant


 Subcentimeter low-attenuation renal lesions, too small to characterize, most 

likely benign simple cortical cysts. No further follow-up necessary.





Afebrile


no leukocytosis


   -CXR: No definite acute process


   -u/a neg





Elevated troponins





legally blindness


L side paralysis





Plan:


-Switch IV Vancomycin and Cefepime #2/5 to PO Levaquin for PNA.


-f/u cx


-Monitor CBC/CMP, temperatures


-HIV ab am


-sp cx





Thank you for this consultation. Will continue to follow along with you.





Discussed with Erica Santos M.D. Jul 20, 2019 21:19

## 2019-07-20 NOTE — CONSULTATION
DATE OF CONSULTATION:  07/20/2019

GASTROENTEROLOGY CONSULTATION



CONSULTING PHYSICIAN:  Arcadio Mckenzie M.D.



CHIEF COMPLAINT:  I was asked to see this patient by Dr. Edwin Collins for

evaluation of abdominal issues.



HISTORY OF PRESENT ILLNESS:  The patient is a 49-year-old woman who was

admitted to the hospital complaining of two days, though, of having

diarrhea and yesterday having vomiting and abdominal pain.  She feels

better today and she is actually hungry.  She has had no recent trips and

no ill contacts, but apparently was recently discharged from the outside

hospital.  The patient has left-sided paralysis due to

childhood brain injury.  She has never had endoscopy or colonoscopy.



PAST MEDICAL HISTORY:  History of bilateral blindness, left upper extremity

paralysis, and recent eye infection.



FAMILY HISTORY:  Noncontributory.



SOCIAL HISTORY:  The patient is single.  She does have a boyfriend.  She

does not smoke or drink alcohol.



MEDICATIONS:  See the chart list for details.



ALLERGIES:  None.



REVIEW OF SYSTEMS:  Otherwise negative.



PHYSICAL EXAMINATION:

GENERAL:  Debilitated woman, seen in her room.

HEENT:  Normocephalic and atraumatic.  Eyes were partially closed,

encrusted bilaterally.  Oropharynx is clear.

NECK:  Supple.

CHEST:  Clear to auscultation.

CARDIOVASCULAR:  Revealed a regular rate.

ABDOMEN:  Soft.  Good bowel sounds.  Nontender.

EXTREMITIES:  Revealed no edema.



LABORATORY DATA:  Laboratory data were noted.  The patient has some mild

level of liver test abnormalities.



ASSESSMENT:  This patient has some nausea, vomiting, and diarrhea with some

mild elevation in liver tests and a recent bout of gastrointestinal

illness.  The patient should have her hepatitis serologies checked.  CT

scan of abdomen and pelvis evaluation done and there is no major

intra-abdominal pathology to explain the patient's complaints.  In

addition, the liver parenchyma appears to be normal.  Given that the

patient's symptoms have subsided, I will proceed with clear liquid diet

and advance as tolerated.  Stool should be checked for pathogens once

obtained.



Thank you for asking me to participate in the care of this patient.









  ______________________________________________

  Arcadio Mckenzie M.D.





DR:  LOEV

D:  07/20/2019 15:26

T:  07/20/2019 22:25

JOB#:  [id number]

CC:



ALIYA

## 2019-07-21 VITALS — SYSTOLIC BLOOD PRESSURE: 145 MMHG | DIASTOLIC BLOOD PRESSURE: 81 MMHG

## 2019-07-21 VITALS — SYSTOLIC BLOOD PRESSURE: 115 MMHG | DIASTOLIC BLOOD PRESSURE: 70 MMHG

## 2019-07-21 VITALS — SYSTOLIC BLOOD PRESSURE: 124 MMHG | DIASTOLIC BLOOD PRESSURE: 84 MMHG

## 2019-07-21 VITALS — DIASTOLIC BLOOD PRESSURE: 73 MMHG | SYSTOLIC BLOOD PRESSURE: 126 MMHG

## 2019-07-21 VITALS — SYSTOLIC BLOOD PRESSURE: 144 MMHG | DIASTOLIC BLOOD PRESSURE: 77 MMHG

## 2019-07-21 VITALS — SYSTOLIC BLOOD PRESSURE: 127 MMHG | DIASTOLIC BLOOD PRESSURE: 89 MMHG

## 2019-07-21 LAB
ADD MANUAL DIFF: YES
ANION GAP SERPL CALC-SCNC: 9 MMOL/L (ref 5–15)
BUN SERPL-MCNC: 5 MG/DL (ref 7–18)
CALCIUM SERPL-MCNC: 8.3 MG/DL (ref 8.5–10.1)
CHLORIDE SERPL-SCNC: 103 MMOL/L (ref 98–107)
CO2 SERPL-SCNC: 25 MMOL/L (ref 21–32)
CREAT SERPL-MCNC: 0.5 MG/DL (ref 0.55–1.3)
ERYTHROCYTE [DISTWIDTH] IN BLOOD BY AUTOMATED COUNT: 10.5 % (ref 11.6–14.8)
HCT VFR BLD CALC: 43.6 % (ref 37–47)
HGB BLD-MCNC: 14.4 G/DL (ref 12–16)
MCV RBC AUTO: 90 FL (ref 80–99)
PLATELET # BLD: 257 K/UL (ref 150–450)
POTASSIUM SERPL-SCNC: 3.5 MMOL/L (ref 3.5–5.1)
RBC # BLD AUTO: 4.86 M/UL (ref 4.2–5.4)
SODIUM SERPL-SCNC: 137 MMOL/L (ref 136–145)
WBC # BLD AUTO: 3.7 K/UL (ref 4.8–10.8)

## 2019-07-21 RX ADMIN — LEVOFLOXACIN SCH MG: 500 TABLET, FILM COATED ORAL at 08:54

## 2019-07-21 RX ADMIN — HEPARIN SODIUM SCH UNITS: 5000 INJECTION INTRAVENOUS; SUBCUTANEOUS at 08:56

## 2019-07-21 RX ADMIN — HEPARIN SODIUM SCH UNITS: 5000 INJECTION INTRAVENOUS; SUBCUTANEOUS at 21:03

## 2019-07-21 NOTE — NUR
HAND-OFF: 

Report given to Isaac/RN, Patient is awake and alert, lying semi-mayen, no acut 
distress/SOB noted, in stable condition. Endorsed plan of care.

## 2019-07-21 NOTE — NUR
NURSE NOTES:

Received report from Claudio/RN, Patient is awake, lying semi-mayen, no distress/SOB noted. 
Checked IV, Patent, no bleeding or infiltration noted at this time. Bed in lowest position 
and locked, Call light within reach. All personal belonging within reach. Will continue plan 
of care.

## 2019-07-21 NOTE — CARDIOLOGY PROGRESS NOTE
Assessment/Plan


Assessment/Plan


1. Elevated troponin I level in this patient, possibilities sepsis, Type II 

NSTEMI or demand-ischemia. No wall motion abnormalities on 2D echo, LVEF 

estimated at 55%. Continue the current management. Scheduled nuclear stress 

test in am.


2. Legally blind.


3. History of food poisoning.


4. History of left upper extremity paralysis.





Subjective


Subjective


Sinus rhythm at rate of 78.





Objective





Last 24 Hour Vital Signs








  Date Time  Temp Pulse Resp B/P (MAP) Pulse Ox O2 Delivery O2 Flow Rate FiO2


 


7/21/19 20:00 98.2 78 16 124/84 (97) 97   


 


7/21/19 16:00 98.3 79 17 127/89 (102) 99   


 


7/21/19 16:00  88      


 


7/21/19 12:00  79      


 


7/21/19 12:00 98.1 80 17 145/81 (102) 99   


 


7/21/19 09:00      Nasal Cannula 2.0 


 


7/21/19 08:00  69      


 


7/21/19 08:00 97.9 79 17 144/77 (99) 98   


 


7/21/19 04:00 98.3 77 17 115/70 (85) 99   


 


7/21/19 03:50  82      


 


7/21/19 00:00 98.1 87 17 126/73 (90) 100   


 


7/20/19 23:42  84      

















Intake and Output  


 


 7/20/19 7/21/19





 18:59 06:59


 


Intake Total 700 ml 385 ml


 


Output Total  200 ml


 


Balance 700 ml 185 ml


 


  


 


Intake Oral 700 ml 


 


IV Total  385 ml


 


Output Urine Total  200 ml








2D Echo:  LVEF 55%, Mild LVH, RVSP 7 mmHg, Grade I LVDD





Laboratory Tests








Test


  7/21/19


09:57


 


White Blood Count


  3.7 K/UL


(4.8-10.8)  L


 


Red Blood Count


  4.86 M/UL


(4.20-5.40)


 


Hemoglobin


  14.4 G/DL


(12.0-16.0)


 


Hematocrit


  43.6 %


(37.0-47.0)


 


Mean Corpuscular Volume 90 FL (80-99)  


 


Mean Corpuscular Hemoglobin


  29.7 PG


(27.0-31.0)


 


Mean Corpuscular Hemoglobin


Concent 33.1 G/DL


(32.0-36.0)


 


Red Cell Distribution Width


  10.5 %


(11.6-14.8)  L


 


Platelet Count


  257 K/UL


(150-450)


 


Mean Platelet Volume


  6.5 FL


(6.5-10.1)


 


Neutrophils (%) (Auto)


  % (45.0-75.0)


 


 


Lymphocytes (%) (Auto)


  % (20.0-45.0)


 


 


Monocytes (%) (Auto)  % (1.0-10.0)  


 


Eosinophils (%) (Auto)  % (0.0-3.0)  


 


Basophils (%) (Auto)  % (0.0-2.0)  


 


Differential Total Cells


Counted 100  


 


 


Neutrophils % (Manual) 75 % (45-75)  


 


Lymphocytes % (Manual) 12 % (20-45)  L


 


Monocytes % (Manual) 13 % (1-10)  H


 


Eosinophils % (Manual) 0 % (0-3)  


 


Basophils % (Manual) 0 % (0-2)  


 


Band Neutrophils 0 % (0-8)  


 


Platelet Estimate Adequate  


 


Platelet Morphology Normal  


 


Red Blood Cell Morphology Normal  


 


Sodium Level


  137 MMOL/L


(136-145)


 


Potassium Level


  3.5 MMOL/L


(3.5-5.1)


 


Chloride Level


  103 MMOL/L


()


 


Carbon Dioxide Level


  25 MMOL/L


(21-32)


 


Anion Gap


  9 mmol/L


(5-15)


 


Blood Urea Nitrogen


  5 mg/dL (7-18)


L


 


Creatinine


  0.5 MG/DL


(0.55-1.30)  L


 


Estimat Glomerular Filtration


Rate > 60 mL/min


(>60)


 


Glucose Level


  100 MG/DL


()


 


Calcium Level


  8.3 MG/DL


(8.5-10.1)  L


 


Troponin I


  0.727 ng/mL


(0.000-0.056)


 


Vancomycin Level Trough


  5.7 ug/mL


(5.0-12.0)


 


HIV (1&2) Antibody Rapid


  Negative


(NEGATIVE)








Objective


HEENT: Atraumatic, anicteric, PERRLA, EOMI, B/L Blindness


NECK: JVP <5 cm, No carotid bruit, carotid upstroke 2+ B/L


CVS: Normal S1S2, No murmurs, gallops or rubs, RRR


LUNGS: Clear to auscultation.


ABDOMEN: Soft non-tender, non-distended, no hepatosplenomegaly, +BS


EXT: No evidence of edema, clubbing or cyanosis.











Asad Campoverde MD Jul 21, 2019 22:22

## 2019-07-21 NOTE — NUR
PT Note

PT marek completed, treatment initiated. Patient was able to take ~ 2 steps at bedside. 
Patient's mobility is limited by generalized weakness and c/o nausea. Patient needs PT to 
increase her muscle strength and balance to improve her safety in mobility and gait to 
enable her to return home.

-------------------------------------------------------------------------------

Addendum: 07/21/19 at 1539 by PORTILLO GONZALES PT

-------------------------------------------------------------------------------

Amended: Links added.

## 2019-07-21 NOTE — GENERAL PROGRESS NOTE
Assessment/Plan


Problem List:  


(1) Elevated troponin


ICD Codes:  R74.8 - Abnormal levels of other serum enzymes


SNOMED:  606949789, 784378802, [id number]


(2) SOB (shortness of breath)


ICD Codes:  R06.02 - Shortness of breath


SNOMED:  852520481


(3) Pneumonia


ICD Codes:  J18.9 - Pneumonia, unspecified organism


SNOMED:  472124359


(4) Abdominal pain


ICD Codes:  R10.9 - Unspecified abdominal pain


SNOMED:  08946941


Status:  unchanged


Assessment/Plan:


o2 pulm tx gi id cardio f/u cbc bmp am





Subjective


Constitutional:  Reports: weakness


Allergies:  


Coded Allergies:  


     No Known Allergies (Unverified , 7/19/19)


All Systems:  reviewed and negative except above


Subjective


calm in bed





Objective





Last 24 Hour Vital Signs








  Date Time  Temp Pulse Resp B/P (MAP) Pulse Ox O2 Delivery O2 Flow Rate FiO2


 


7/21/19 08:00 97.9 79 17 144/77 (99) 98   


 


7/21/19 04:00 98.3 77 17 115/70 (85) 99   


 


7/21/19 03:50  82      


 


7/21/19 00:00 98.1 87 17 126/73 (90) 100   


 


7/20/19 23:42  84      


 


7/20/19 21:00      Nasal Cannula 2.0 


 


7/20/19 20:00  104      


 


7/20/19 20:00 98.5 80 17 102/71 (81) 99   


 


7/20/19 16:00  76      


 


7/20/19 16:00 98.1 70 17 114/94 (101) 98   


 


7/20/19 15:17      Nasal Cannula 2.0 


 


7/20/19 12:00  71      


 


7/20/19 12:00 97.8 82 17 113/71 (85) 99   

















Intake and Output  


 


 7/20/19 7/21/19





 18:59 06:59


 


Intake Total 700 ml 385 ml


 


Output Total  200 ml


 


Balance 700 ml 185 ml


 


  


 


Intake Oral 700 ml 


 


IV Total  385 ml


 


Output Urine Total  200 ml








Height (Feet):  5


Height (Inches):  0.00


Weight (Pounds):  140


General Appearance:  lethargic


EENT:  normal ENT inspection


Neck:  normal alignment


Cardiovascular:  normal peripheral pulses, normal rate, regular rhythm


Respiratory/Chest:  chest wall non-tender, lungs clear, normal breath sounds


Abdomen:  normal bowel sounds, non tender, soft


Extremities:  normal inspection


Edema:  no edema noted Arm (L), no edema noted Arm (R), no edema noted Leg (L), 

no edema noted Leg (R), no edema noted Pedal (L), no edema noted Pedal (R), no 

edema noted Generalized


Neurologic:  responsive, motor weakness


Skin:  normal pigmentation, warm/dry











Edwin Collins DO Jul 21, 2019 09:33

## 2019-07-21 NOTE — GENERAL PROGRESS NOTE
Assessment/Plan


Status:  unchanged


Assessment/Plan:


Assessment


- resolved abd pain


- resolving N/V


- resolved diarrhea


- possibly viral gastroenteritis





Recommendations


- advance diet 


- follow symptoms





Subjective


Allergies:  


Coded Allergies:  


     No Known Allergies (Unverified , 7/19/19)


Subjective


no further vomiting


tolerating clears





Objective





Last 24 Hour Vital Signs








  Date Time  Temp Pulse Resp B/P (MAP) Pulse Ox O2 Delivery O2 Flow Rate FiO2


 


7/21/19 09:00      Nasal Cannula 2.0 


 


7/21/19 08:00  69      


 


7/21/19 08:00 97.9 79 17 144/77 (99) 98   


 


7/21/19 04:00 98.3 77 17 115/70 (85) 99   


 


7/21/19 03:50  82      


 


7/21/19 00:00 98.1 87 17 126/73 (90) 100   


 


7/20/19 23:42  84      


 


7/20/19 21:00      Nasal Cannula 2.0 


 


7/20/19 20:00  104      


 


7/20/19 20:00 98.5 80 17 102/71 (81) 99   


 


7/20/19 16:00  76      


 


7/20/19 16:00 98.1 70 17 114/94 (101) 98   


 


7/20/19 15:17      Nasal Cannula 2.0 


 


7/20/19 12:00  71      


 


7/20/19 12:00 97.8 82 17 113/71 (85) 99   

















Intake and Output  


 


 7/20/19 7/21/19





 18:59 06:59


 


Intake Total 700 ml 385 ml


 


Output Total  200 ml


 


Balance 700 ml 185 ml


 


  


 


Intake Oral 700 ml 


 


IV Total  385 ml


 


Output Urine Total  200 ml








Laboratory Tests


7/21/19 09:57: 


White Blood Count 3.7L, Red Blood Count 4.86, Hemoglobin 14.4, Hematocrit 43.6, 

Mean Corpuscular Volume 90, Mean Corpuscular Hemoglobin 29.7, Mean Corpuscular 

Hemoglobin Concent 33.1, Red Cell Distribution Width 10.5L, Platelet Count 257, 

Mean Platelet Volume 6.5, Neutrophils (%) (Auto) , Lymphocytes (%) (Auto) , 

Monocytes (%) (Auto) , Eosinophils (%) (Auto) , Basophils (%) (Auto) , 

Neutrophils % (Manual) [Pending], Lymphocytes % (Manual) [Pending], Platelet 

Estimate [Pending], Platelet Morphology [Pending], Sodium Level 137, Potassium 

Level 3.5, Chloride Level 103, Carbon Dioxide Level 25, Anion Gap 9, Blood Urea 

Nitrogen 5L, Creatinine 0.5L, Estimat Glomerular Filtration Rate > 60, Glucose 

Level 100, Calcium Level 8.3L, Troponin I [Pending], Vancomycin Level Trough [

Pending], HIV (1&2) Antibody Rapid [Pending]


Height (Feet):  5


Height (Inches):  0.00


Weight (Pounds):  140


Objective


NAD


eyes closed


Neck supple


CTA


RRR


abd soft


(+) molly











Arcadio Mckenzie MD Jul 21, 2019 10:30

## 2019-07-21 NOTE — NUR
NURSE NOTES:

Got report from Sunni RN. Pt in stable condition. Denies any pain. No s/s of distress or 
discomfort noted. Pt resting in bed comfortably. Bed in low and locked position, call light 
within reach, bedside table within reach. Continue to monitor.

## 2019-07-21 NOTE — NUR
NURSE NOTES:

Patient troponin is trending up, from 0.520 to 0.727. Dr. Gilles Kamara is aware. No new 
order at this time.

## 2019-07-21 NOTE — NUR
NURSE NOTES:

Patient is paralyzed on left side and uses her right elbow to pull herself up in bed, and 
her elbow have some redness. picture taken and uploaded on system. Site cleaned and covered 
with Optifoam.

## 2019-07-22 VITALS — SYSTOLIC BLOOD PRESSURE: 126 MMHG | DIASTOLIC BLOOD PRESSURE: 86 MMHG

## 2019-07-22 VITALS — DIASTOLIC BLOOD PRESSURE: 82 MMHG | SYSTOLIC BLOOD PRESSURE: 112 MMHG

## 2019-07-22 VITALS — SYSTOLIC BLOOD PRESSURE: 100 MMHG | DIASTOLIC BLOOD PRESSURE: 61 MMHG

## 2019-07-22 VITALS — SYSTOLIC BLOOD PRESSURE: 106 MMHG | DIASTOLIC BLOOD PRESSURE: 78 MMHG

## 2019-07-22 VITALS — SYSTOLIC BLOOD PRESSURE: 130 MMHG | DIASTOLIC BLOOD PRESSURE: 84 MMHG

## 2019-07-22 VITALS — DIASTOLIC BLOOD PRESSURE: 80 MMHG | SYSTOLIC BLOOD PRESSURE: 114 MMHG

## 2019-07-22 LAB
ADD MANUAL DIFF: NO
ANION GAP SERPL CALC-SCNC: 10 MMOL/L (ref 5–15)
BASOPHILS NFR BLD AUTO: 0.8 % (ref 0–2)
BUN SERPL-MCNC: 7 MG/DL (ref 7–18)
CALCIUM SERPL-MCNC: 8.6 MG/DL (ref 8.5–10.1)
CHLORIDE SERPL-SCNC: 103 MMOL/L (ref 98–107)
CO2 SERPL-SCNC: 25 MMOL/L (ref 21–32)
CREAT SERPL-MCNC: 0.6 MG/DL (ref 0.55–1.3)
EOSINOPHIL NFR BLD AUTO: 1.4 % (ref 0–3)
ERYTHROCYTE [DISTWIDTH] IN BLOOD BY AUTOMATED COUNT: 10.1 % (ref 11.6–14.8)
HCT VFR BLD CALC: 43.4 % (ref 37–47)
HGB BLD-MCNC: 14.7 G/DL (ref 12–16)
LYMPHOCYTES NFR BLD AUTO: 15.6 % (ref 20–45)
MCV RBC AUTO: 89 FL (ref 80–99)
MONOCYTES NFR BLD AUTO: 18.4 % (ref 1–10)
NEUTROPHILS NFR BLD AUTO: 63.8 % (ref 45–75)
PLATELET # BLD: 257 K/UL (ref 150–450)
POTASSIUM SERPL-SCNC: 3.2 MMOL/L (ref 3.5–5.1)
RBC # BLD AUTO: 4.85 M/UL (ref 4.2–5.4)
SODIUM SERPL-SCNC: 138 MMOL/L (ref 136–145)
WBC # BLD AUTO: 3.7 K/UL (ref 4.8–10.8)

## 2019-07-22 RX ADMIN — HEPARIN SODIUM SCH UNITS: 5000 INJECTION INTRAVENOUS; SUBCUTANEOUS at 20:51

## 2019-07-22 RX ADMIN — ASPIRIN 81 MG SCH MG: 81 TABLET ORAL at 14:48

## 2019-07-22 RX ADMIN — DOCUSATE SODIUM SCH MG: 100 CAPSULE, LIQUID FILLED ORAL at 17:37

## 2019-07-22 RX ADMIN — HEPARIN SODIUM SCH UNITS: 5000 INJECTION INTRAVENOUS; SUBCUTANEOUS at 10:00

## 2019-07-22 RX ADMIN — LEVOFLOXACIN SCH MG: 500 TABLET, FILM COATED ORAL at 09:59

## 2019-07-22 RX ADMIN — REGADENOSON SCH MG: 0.08 INJECTION, SOLUTION INTRAVENOUS at 11:00

## 2019-07-22 RX ADMIN — PROMETHAZINE HYDROCHLORIDE PRN ML: 6.25 SOLUTION ORAL at 20:54

## 2019-07-22 RX ADMIN — REGADENOSON SCH MG: 0.08 INJECTION, SOLUTION INTRAVENOUS at 10:55

## 2019-07-22 NOTE — NUR
NURSE NOTES:

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 Witnessed verbal consent for Stress Test today with Gil from 
Cardiology. Pt agreed to Stress Test today. Continue to monitor.

## 2019-07-22 NOTE — NUR
NURSE NOTES:

received pt in the bed, awake, alert,oriented, pt blind, vital signs stable, no co pain, no 
SOB, PT FOR LEXISCAN test, skin warm and dry to touch, intact, bed in low position, call 
light within reach, K 3.2, dr. Collins notified.

## 2019-07-22 NOTE — NUR
NURSE NOTES:

Received pt and report from GLORY Koch. Observed pt resting in bed with both eyes closed. Pt 
is blind bilaterally. Cardiac monitor is in placed, IV site intact, asymptomatic and patent. 
Bed is in the lowest position and locked. Call light within reach. No signs and symptoms of 
acute distress noted at this time. Will continue plan of care.

## 2019-07-22 NOTE — PULMONOLOGY PROGRESS NOTE
Assessment/Plan


Problems:  


(1) Non-ST elevation (NSTEMI) myocardial infarction


(2) Blindness of both eyes


(3) Gastroenteritis


(4) Elevated troponin


Assessment/Plan


stress study in progress


symptomatic treatment


dc home if stress test negative.





Subjective


ROS Limited/Unobtainable:  No


Constitutional:  Reports: no symptoms


HEENT:  Repors: no symptoms


Respiratory:  Reports: no symptoms


Allergies:  


Coded Allergies:  


     No Known Allergies (Unverified , 7/19/19)





Objective





Last 24 Hour Vital Signs








  Date Time  Temp Pulse Resp B/P (MAP) Pulse Ox O2 Delivery O2 Flow Rate FiO2


 


7/22/19 12:00 97.9 64 20 126/86 (99) 94   


 


7/22/19 09:00      Nasal Cannula 2.0 


 


7/22/19 08:00  76      


 


7/22/19 08:00 98.6 70 20 100/61 (74) 98   


 


7/22/19 04:20 98.0 83 16 130/84 (99) 95   


 


7/22/19 04:00  69      


 


7/22/19 00:07 97.9 79 17 112/82 (92) 96   


 


7/22/19 00:07  79      


 


7/21/19 21:00      Nasal Cannula 2.0 


 


7/21/19 20:00 98.2 78 16 124/84 (97) 97   


 


7/21/19 20:00  79      


 


7/21/19 16:00 98.3 79 17 127/89 (102) 99   


 


7/21/19 16:00  88      

















Intake and Output  


 


 7/21/19 7/22/19





 19:00 07:00


 


Intake Total 900 ml 


 


Balance 900 ml 


 


  


 


Intake Oral 900 ml 


 


# Voids 3 4








General Appearance:  WD/WN


HEENT:  normocephalic, atraumatic


Respiratory/Chest:  chest wall non-tender, lungs clear


Breasts:  no masses


Abdomen:  normal bowel sounds, no organomegaly


Genitourinary:  normal external genitalia


Extremities:  no clubbing


Neurologic/Psychiatric:  CNs II-XII grossly normal


Lymphatic:  no neck adenopathy


Laboratory Tests


7/22/19 04:35: 


White Blood Count 3.7L, Red Blood Count 4.85, Hemoglobin 14.7, Hematocrit 43.4, 

Mean Corpuscular Volume 89, Mean Corpuscular Hemoglobin 30.3, Mean Corpuscular 

Hemoglobin Concent 33.9, Red Cell Distribution Width 10.1L, Platelet Count 257, 

Mean Platelet Volume 7.7, Neutrophils (%) (Auto) 63.8, Lymphocytes (%) (Auto) 

15.6L, Monocytes (%) (Auto) 18.4H, Eosinophils (%) (Auto) 1.4, Basophils (%) (

Auto) 0.8, Sodium Level 138, Potassium Level 3.2L, Chloride Level 103, Carbon 

Dioxide Level 25, Anion Gap 10, Blood Urea Nitrogen 7, Creatinine 0.6, Estimat 

Glomerular Filtration Rate > 60, Glucose Level 102, Calcium Level 8.6





Current Medications








 Medications


  (Trade)  Dose


 Ordered  Sig/Rosalio


 Route


 PRN Reason  Start Time


 Stop Time Status Last Admin


Dose Admin


 


 Acetaminophen


  (Tylenol)  650 mg  Q4H  PRN


 ORAL


 FEVER  7/19/19 17:48


 8/18/19 17:47   


 


 


 Albuterol/


 Ipratropium


  (Albuterol/


 Ipratropium)  3 ml  Q4H  PRN


 HHN


 Shortness of Breath  7/19/19 17:45


 7/24/19 17:44   


 


 


 Aspirin


  (ASA)  81 mg  DAILY


 ORAL


   7/22/19 12:30


 8/21/19 12:29   


 


 


 Dextrose


  (Dextrose 50%)  25 ml  Q30M  PRN


 IV


 Hypoglycemia  7/19/19 17:45


 8/18/19 17:44   


 


 


 Dextrose


  (Dextrose 50%)  50 ml  Q30M  PRN


 IV


 Hypoglycemia  7/19/19 17:45


 8/18/19 17:44   


 


 


 Docusate Sodium


  (Colace)  100 mg  TWICE A  DAY


 ORAL


   7/22/19 18:00


 8/21/19 17:59   


 


 


 Heparin Sodium


  (Porcine)


  (Heparin 5000


 units/ml)  5,000 units  EVERY 12  HOURS


 SUBQ


   7/19/19 21:00


 8/18/19 20:59  7/22/19 10:00


 


 


 Iopamidol


  (Isovue-300


 100ml)  100 ml  NOW  PRN


 INJ


 Radiology Procedure  7/19/19 14:30


     


 


 


 Levofloxacin


  (Levaquin)  500 mg  DAILY


 ORAL


   7/21/19 09:00


 7/28/19 08:59  7/22/19 09:59


 


 


 Morphine Sulfate


  (Morphine


 Sulfate)  2 mg  Q4H  PRN


 IVP


 Severe Pain (Pain Scale 7-10)  7/19/19 17:48


 7/26/19 17:47   


 


 


 Ondansetron HCl


  (Zofran)  4 mg  Q6H  PRN


 IVP


 Nausea & Vomiting  7/19/19 17:45


 8/18/19 17:44  7/22/19 06:37


 


 


 Polyethylene


 Glycol


  (Miralax)  17 gm  DAILYPRN  PRN


 ORAL


 Constipation  7/19/19 17:45


 8/18/19 17:44   


 


 


 Promethazine HCl/


 Codeine


  (Phenergan with


 Codeine)  5 ml  Q6H  PRN


 ORAL


 For Cough  7/22/19 07:15


 8/21/19 07:14   


 


 


 Regadenoson


  (Lexiscan)  0.4 mg  ONCE


 IV


   7/22/19 11:00


 8/22/19 15:00   


 

















Gerald Aguilar MD Jul 22, 2019 12:50

## 2019-07-22 NOTE — DIAGNOSTIC IMAGING REPORT
Indications: Chest pain

 

Technique: Single day single isotope protocol utilized. Initially, resting images

obtained using IV administration 10.5 millicuries 99M technetium Myoview.

Subsequently, patient underwent  lexiscan stress testing. See cardiology report for

details. During duplex infusion, IV administration 32.2 mCi 99 M technetium Myoview.

SPECT and planar images obtained. SPECT images gated to 8 phases of the cardiac cycle

were also obtained, and reformatted into cine images for evaluation of ejection

fraction.

 

Comparison: none

 

Findings: Presence or absence of symptoms during infusion is not described on the

cardiology report. Per cardiology report, resting EKG demonstrates normal sinus

rhythm with diffuse baseline some asymmetric T wave inversion suggestive of ischemia.

No ST changes noted during infusion.  Imaging demonstrates normal poststress

perfusion. No fixed nor reversible perfusion defects Normal cardiac chamber size.

Calculated post stress ejection fraction 45%. Wall motion analysis suggests decreased

or absent wall motion in the inferior wall

 

Impression: Nonischemic clinical response to pharmacologic stress, per cardiology

report

 

Nonischemic electrocardiographic response to pharmacologic stress, per cardiology

report

 

No imaging findings to suggest ischemia, at level of stress achieved.

 

Calculated post stress ejection fraction 45%. Note evidence of decreased inferior and

septal wall motion. Significance/etiology of this in the absence of a perfusion

abnormality is uncertain. Correlate with findings on recent echocardiogram

## 2019-07-22 NOTE — INFECTIOUS DISEASES PROG NOTE
Assessment/Plan


Assessment/Plan





Assessment:


Abd pain, Nausea


 Recent food poisoning vs viral gastroenteritis


PNA (+cough)


  -CT abd/p: The visualized lung bases to the scattered patchy pulmonary 

parenchymal infiltrates, most likely pneumonia. Limited assessment of the GI 

tract, due to lack of enteric contrast administration. No definite acute 

abdominal or pelvic process. Nonspecific mild endometrial thickening and fluid. 

Consider pelvic sonographic correlation as clinically indicated


 Possible collapsed left ovarian follicle. Mild superior endplate depression of 

the L1 vertebral body. Age indeterminate. Consider MRI for better 

characterization if clinically relevant


 Subcentimeter low-attenuation renal lesions, too small to characterize, most 

likely benign simple cortical cysts. No further follow-up necessary.





Afebrile


no leukocytosis


   -CXR: No definite acute process


   -u/a neg





Elevated troponins





legally blindness


L side paralysis





HIV ab screen neg





Plan:


-Continue PO Levaquin #3 (abx d #4/5) for PNA.


    -7/20 SP IV Vancomycin #2, Cefepime #2\


    -7/19 SP Ceftriaxone x1





-f/u cx


-Monitor CBC/CMP, temperatures


-f/u sp cx





Thank you for this consultation. Will continue to follow along with you.





Discussed with RN.





Subjective


Allergies:  


Coded Allergies:  


     No Known Allergies (Unverified , 7/19/19)


Subjective


afebrile


no leukocytosis


at 2l NC





Objective


Vital Signs





Last 24 Hour Vital Signs








  Date Time  Temp Pulse Resp B/P (MAP) Pulse Ox O2 Delivery O2 Flow Rate FiO2


 


7/22/19 08:00 98.6 70 20 100/61 (74) 98   


 


7/22/19 04:20 98.0 83 16 130/84 (99) 95   


 


7/22/19 04:00  69      


 


7/22/19 00:07 97.9 79 17 112/82 (92) 96   


 


7/22/19 00:07  79      


 


7/21/19 21:00      Nasal Cannula 2.0 


 


7/21/19 20:00 98.2 78 16 124/84 (97) 97   


 


7/21/19 20:00  79      


 


7/21/19 16:00 98.3 79 17 127/89 (102) 99   


 


7/21/19 16:00  88      


 


7/21/19 12:00  79      


 


7/21/19 12:00 98.1 80 17 145/81 (102) 99   








Height (Feet):  5


Height (Inches):  0.00


Weight (Pounds):  140


Objective


GENERAL:  Calm in bed, O2 NC, slight short of breath.  No


complaint.


CARDIOVASCULAR:  No murmur.


LUNGS:  Distant and clear.


ABDOMEN:  Bowel sounds positive.  Nontender.  Nondistended.


EXTREMITIES:  No cyanosis, clubbing, or edema.  Left hand contracture


noted.


NEUROLOGIC:  The patient moves all extremities, slightly weak.





Laboratory Tests








Test


  7/22/19


04:35


 


White Blood Count


  3.7 K/UL


(4.8-10.8)  L


 


Red Blood Count


  4.85 M/UL


(4.20-5.40)


 


Hemoglobin


  14.7 G/DL


(12.0-16.0)


 


Hematocrit


  43.4 %


(37.0-47.0)


 


Mean Corpuscular Volume 89 FL (80-99)  


 


Mean Corpuscular Hemoglobin


  30.3 PG


(27.0-31.0)


 


Mean Corpuscular Hemoglobin


Concent 33.9 G/DL


(32.0-36.0)


 


Red Cell Distribution Width


  10.1 %


(11.6-14.8)  L


 


Platelet Count


  257 K/UL


(150-450)


 


Mean Platelet Volume


  7.7 FL


(6.5-10.1)


 


Neutrophils (%) (Auto)


  63.8 %


(45.0-75.0)


 


Lymphocytes (%) (Auto)


  15.6 %


(20.0-45.0)  L


 


Monocytes (%) (Auto)


  18.4 %


(1.0-10.0)  H


 


Eosinophils (%) (Auto)


  1.4 %


(0.0-3.0)


 


Basophils (%) (Auto)


  0.8 %


(0.0-2.0)


 


Sodium Level


  138 MMOL/L


(136-145)


 


Potassium Level


  3.2 MMOL/L


(3.5-5.1)  L


 


Chloride Level


  103 MMOL/L


()


 


Carbon Dioxide Level


  25 MMOL/L


(21-32)


 


Anion Gap


  10 mmol/L


(5-15)


 


Blood Urea Nitrogen


  7 mg/dL (7-18)


 


 


Creatinine


  0.6 MG/DL


(0.55-1.30)


 


Estimat Glomerular Filtration


Rate > 60 mL/min


(>60)


 


Glucose Level


  102 MG/DL


()


 


Calcium Level


  8.6 MG/DL


(8.5-10.1)











Current Medications








 Medications


  (Trade)  Dose


 Ordered  Sig/Rosalio


 Route


 PRN Reason  Start Time


 Stop Time Status Last Admin


Dose Admin


 


 Acetaminophen


  (Tylenol)  650 mg  Q4H  PRN


 ORAL


 FEVER  7/19/19 17:48


 8/18/19 17:47   


 


 


 Albuterol/


 Ipratropium


  (Albuterol/


 Ipratropium)  3 ml  Q4H  PRN


 HHN


 Shortness of Breath  7/19/19 17:45


 7/24/19 17:44   


 


 


 Dextrose


  (Dextrose 50%)  25 ml  Q30M  PRN


 IV


 Hypoglycemia  7/19/19 17:45


 8/18/19 17:44   


 


 


 Dextrose


  (Dextrose 50%)  50 ml  Q30M  PRN


 IV


 Hypoglycemia  7/19/19 17:45


 8/18/19 17:44   


 


 


 Heparin Sodium


  (Porcine)


  (Heparin 5000


 units/ml)  5,000 units  EVERY 12  HOURS


 SUBQ


   7/19/19 21:00


 8/18/19 20:59  7/22/19 10:00


 


 


 Iopamidol


  (Isovue-300


 100ml)  100 ml  NOW  PRN


 INJ


 Radiology Procedure  7/19/19 14:30


     


 


 


 Levofloxacin


  (Levaquin)  500 mg  DAILY


 ORAL


   7/21/19 09:00


 7/28/19 08:59  7/22/19 09:59


 


 


 Morphine Sulfate


  (Morphine


 Sulfate)  2 mg  Q4H  PRN


 IVP


 Severe Pain (Pain Scale 7-10)  7/19/19 17:48


 7/26/19 17:47   


 


 


 Ondansetron HCl


  (Zofran)  4 mg  Q6H  PRN


 IVP


 Nausea & Vomiting  7/19/19 17:45


 8/18/19 17:44  7/22/19 06:37


 


 


 Polyethylene


 Glycol


  (Miralax)  17 gm  DAILYPRN  PRN


 ORAL


 Constipation  7/19/19 17:45


 8/18/19 17:44   


 


 


 Potassium Chloride


  (K-Dur)  30 meq  ONCE


 ORAL


   7/22/19 09:31


 7/22/19 10:45  7/22/19 09:59


 


 


 Promethazine HCl/


 Codeine


  (Phenergan with


 Codeine)  5 ml  Q6H  PRN


 ORAL


 For Cough  7/22/19 07:15


 8/21/19 07:14   


 


 


 Regadenoson


  (Lexiscan)  0.4 mg  ONCE


 IV


   7/22/19 11:00


 8/22/19 15:00   


 

















Erica Meraz M.D. Jul 22, 2019 10:38

## 2019-07-22 NOTE — CARDIOLOGY PROGRESS NOTE
Assessment/Plan


Assessment/Plan


1. Elevated troponin I level in this patient, possibilities sepsis, Type II 

NSTEMI or demand-ischemia. No wall motion abnormalities on 2D echo, LVEF 

estimated at 55%. Continue the current management. Nuclear stress test is non-

ischemic.


2. Legally blind.


3. History of food poisoning.


4. History of left upper extremity paralysis.





Subjective


Subjective


Sinus rhythm at rate of 83.


s/p stress test today.





Objective





Last 24 Hour Vital Signs








  Date Time  Temp Pulse Resp B/P (MAP) Pulse Ox O2 Delivery O2 Flow Rate FiO2


 


7/22/19 21:00      Room Air  


 


7/22/19 20:00 98.2 83 16 114/80 (91) 98   


 


7/22/19 20:00  74      


 


7/22/19 16:00 98.6 74 20 106/78 (87) 95   


 


7/22/19 16:00  78      


 


7/22/19 12:00  76      


 


7/22/19 12:00 97.9 64 20 126/86 (99) 94   


 


7/22/19 09:00      Nasal Cannula 2.0 


 


7/22/19 08:00  76      


 


7/22/19 08:00 98.6 70 20 100/61 (74) 98   


 


7/22/19 04:20 98.0 83 16 130/84 (99) 95   


 


7/22/19 04:00  69      


 


7/22/19 00:07 97.9 79 17 112/82 (92) 96   


 


7/22/19 00:07  79      

















Intake and Output  


 


 7/21/19 7/22/19





 18:59 06:59


 


Intake Total 900 ml 


 


Balance 900 ml 


 


  


 


Intake Oral 900 ml 


 


# Voids 3 4








2D Echo:  LVEF 55%, Mild LVH, RVSP 7 mmHg, Grade I LVDD





Laboratory Tests








Test


  7/22/19


04:35


 


White Blood Count


  3.7 K/UL


(4.8-10.8)  L


 


Red Blood Count


  4.85 M/UL


(4.20-5.40)


 


Hemoglobin


  14.7 G/DL


(12.0-16.0)


 


Hematocrit


  43.4 %


(37.0-47.0)


 


Mean Corpuscular Volume 89 FL (80-99)  


 


Mean Corpuscular Hemoglobin


  30.3 PG


(27.0-31.0)


 


Mean Corpuscular Hemoglobin


Concent 33.9 G/DL


(32.0-36.0)


 


Red Cell Distribution Width


  10.1 %


(11.6-14.8)  L


 


Platelet Count


  257 K/UL


(150-450)


 


Mean Platelet Volume


  7.7 FL


(6.5-10.1)


 


Neutrophils (%) (Auto)


  63.8 %


(45.0-75.0)


 


Lymphocytes (%) (Auto)


  15.6 %


(20.0-45.0)  L


 


Monocytes (%) (Auto)


  18.4 %


(1.0-10.0)  H


 


Eosinophils (%) (Auto)


  1.4 %


(0.0-3.0)


 


Basophils (%) (Auto)


  0.8 %


(0.0-2.0)


 


Sodium Level


  138 MMOL/L


(136-145)


 


Potassium Level


  3.2 MMOL/L


(3.5-5.1)  L


 


Chloride Level


  103 MMOL/L


()


 


Carbon Dioxide Level


  25 MMOL/L


(21-32)


 


Anion Gap


  10 mmol/L


(5-15)


 


Blood Urea Nitrogen


  7 mg/dL (7-18)


 


 


Creatinine


  0.6 MG/DL


(0.55-1.30)


 


Estimat Glomerular Filtration


Rate > 60 mL/min


(>60)


 


Glucose Level


  102 MG/DL


()


 


Calcium Level


  8.6 MG/DL


(8.5-10.1)








Objective


HEENT: Atraumatic, anicteric, PERRLA, EOMI, B/L Blindness


NECK: JVP <5 cm, No carotid bruit, carotid upstroke 2+ B/L


CVS: Normal S1S2, No murmurs, gallops or rubs, RRR


LUNGS: Clear to auscultation.


ABDOMEN: Soft non-tender, non-distended, no hepatosplenomegaly, +BS


EXT: No evidence of edema, clubbing or cyanosis.











Asad Campoverde MD Jul 22, 2019 23:39

## 2019-07-22 NOTE — CARDIOLOGY REPORT
--------------- APPROVED REPORT --------------





EXAM: Two-dimensional and M-mode echocardiogram with Doppler and color Doppler.



INDICATION

Cardiomyopathy



M-Mode DIMENSIONS 

IVSd1.5 (0.7-1.1cm)Left Atrium (MM)2.7 (1.6-4.0cm)

LVDd3.7 (3.5-5.6cm)Aortic Root3.4 (2.0-3.7cm)

PWd1.3 (0.7-1.1cm)Aortic Cusp Exc.1.8 (1.5-2.0cm)



LVDs2.5 (2.5-4.0cm)

PWs1.8 cm





Technically difficult study due to poor acoustic windows.

Study quality precludes accurate assessment of regional wall motion.

Normal left ventricular chamber size, systolic function and wall motion.

Left ventricular ejection fraction estimated to be 50-55 %.

Mild left ventricular hypertrophy.

No evidence of pericardial effusion. 

All other cardiac chamber sizes appear to be within normal limits. 

Normal appearing aortic, mitral, and tricuspid valves.

Mild mitral annulus and aortic root calcification.

Pulmonic valve not well visualized.

IVC is normal in size with physiological collapse. 



A  color flow and spectral Doppler study was performed and revealed:

No aortic regurgitation.

No mitral regurgitation.

Mitral diastolic velocities suggest mild left ventricular diastolic dysfunction (Grade 

I). 

No tricuspid regurgitation.

Tricuspid systolic velocities suggests peak right ventricular systolic pressure of 7 mmHg.

No pulmonic regurgitation present.

## 2019-07-22 NOTE — NUR
CASE MANAGEMENT:REVIEW



7/21/19

SI: NSTEMI 

98.1   80  17  145/81  99% ON 2L/NC

TROPONIN(+) 0.727



IS: LEVAQUIN PO QD

HEPARIN SQ Q12

**: TELEMETRY STATUS



PLAN:

STRESS TEST ORDERED FOR TOMORROW





7/22/19

SI: NSTEMI

98.6   70  20  100/61   98% ON 2L/NC

K-3.2



IS: IV LEXISCAN X1

LEVAQUIN PO QD

HEPARIN SQ Q12

IV ZOFRAN Q6HRS PRN

**: TELEMETRY STATUS

DCP: FROM HOME



PLAN:

LEXISCAN STRESS TEST IN PROGRESS

## 2019-07-22 NOTE — GENERAL PROGRESS NOTE
Assessment/Plan


Problem List:  


(1) Elevated troponin


ICD Codes:  R74.8 - Abnormal levels of other serum enzymes


SNOMED:  152106832, 372374127, 012035498


(2) SOB (shortness of breath)


ICD Codes:  R06.02 - Shortness of breath


SNOMED:  894666341


(3) Pneumonia


ICD Codes:  J18.9 - Pneumonia, unspecified organism


SNOMED:  398913638


(4) Abdominal pain


ICD Codes:  R10.9 - Unspecified abdominal pain


SNOMED:  67191812


Status:  unchanged


Assessment/Plan:


o2 pulm tx gi id cardio f/u cbc bmp am





Subjective


Constitutional:  Reports: weakness


Allergies:  


Coded Allergies:  


     No Known Allergies (Unverified , 7/19/19)


All Systems:  reviewed and negative except above


Subjective


 pending stress test calm





Objective





Last 24 Hour Vital Signs








  Date Time  Temp Pulse Resp B/P (MAP) Pulse Ox O2 Delivery O2 Flow Rate FiO2


 


7/22/19 08:00 98.6 70 20 100/61 (74) 98   


 


7/22/19 04:20 98.0 83 16 130/84 (99) 95   


 


7/22/19 04:00  69      


 


7/22/19 00:07 97.9 79 17 112/82 (92) 96   


 


7/22/19 00:07  79      


 


7/21/19 21:00      Nasal Cannula 2.0 


 


7/21/19 20:00 98.2 78 16 124/84 (97) 97   


 


7/21/19 20:00  79      


 


7/21/19 16:00 98.3 79 17 127/89 (102) 99   


 


7/21/19 16:00  88      


 


7/21/19 12:00  79      


 


7/21/19 12:00 98.1 80 17 145/81 (102) 99   

















Intake and Output  


 


 7/21/19 7/22/19





 19:00 07:00


 


Intake Total 900 ml 


 


Balance 900 ml 


 


  


 


Intake Oral 900 ml 


 


# Voids 3 4








Laboratory Tests


7/21/19 09:57: 


White Blood Count 3.7L, Red Blood Count 4.86, Hemoglobin 14.4, Hematocrit 43.6, 

Mean Corpuscular Volume 90, Mean Corpuscular Hemoglobin 29.7, Mean Corpuscular 

Hemoglobin Concent 33.1, Red Cell Distribution Width 10.5L, Platelet Count 257, 

Mean Platelet Volume 6.5, Neutrophils (%) (Auto) , Lymphocytes (%) (Auto) , 

Monocytes (%) (Auto) , Eosinophils (%) (Auto) , Basophils (%) (Auto) , 

Differential Total Cells Counted 100, Neutrophils % (Manual) 75, Lymphocytes % (

Manual) 12L, Monocytes % (Manual) 13H, Eosinophils % (Manual) 0, Basophils % (

Manual) 0, Band Neutrophils 0, Platelet Estimate Adequate, Platelet Morphology 

Normal, Red Blood Cell Morphology Normal, Sodium Level 137, Potassium Level 3.5

, Chloride Level 103, Carbon Dioxide Level 25, Anion Gap 9, Blood Urea Nitrogen 

5L, Creatinine 0.5L, Estimat Glomerular Filtration Rate > 60, Glucose Level 100

, Calcium Level 8.3L, Troponin I 0.727H, Vancomycin Level Trough 5.7, HIV (1&2) 

Antibody Rapid Negative


7/22/19 04:35: 


White Blood Count 3.7L, Red Blood Count 4.85, Hemoglobin 14.7, Hematocrit 43.4, 

Mean Corpuscular Volume 89, Mean Corpuscular Hemoglobin 30.3, Mean Corpuscular 

Hemoglobin Concent 33.9, Red Cell Distribution Width 10.1L, Platelet Count 257, 

Mean Platelet Volume 7.7, Neutrophils (%) (Auto) 63.8, Lymphocytes (%) (Auto) 

15.6L, Monocytes (%) (Auto) 18.4H, Eosinophils (%) (Auto) 1.4, Basophils (%) (

Auto) 0.8, Sodium Level 138, Potassium Level 3.2L, Chloride Level 103, Carbon 

Dioxide Level 25, Anion Gap 10, Blood Urea Nitrogen 7, Creatinine 0.6, Estimat 

Glomerular Filtration Rate > 60, Glucose Level 102, Calcium Level 8.6


Height (Feet):  5


Height (Inches):  0.00


Weight (Pounds):  140


General Appearance:  lethargic


EENT:  normal ENT inspection


Neck:  normal alignment


Cardiovascular:  normal peripheral pulses, normal rate, regular rhythm


Respiratory/Chest:  chest wall non-tender, lungs clear, normal breath sounds


Abdomen:  normal bowel sounds, non tender, soft


Extremities:  normal inspection


Edema:  no edema noted Arm (L), no edema noted Arm (R), no edema noted Leg (L), 

no edema noted Leg (R), no edema noted Pedal (L), no edema noted Pedal (R), no 

edema noted Generalized


Neurologic:  motor weakness


Skin:  normal pigmentation, warm/dry











Edwin Collins DO Jul 22, 2019 09:37

## 2019-07-22 NOTE — GI PROGRESS NOTE
Assessment/Plan


Problems:  


(1) Gastroenteritis


ICD Codes:  K52.9 - Noninfective gastroenteritis and colitis, unspecified


SNOMED:  35096701


(2) Elevated troponin


ICD Codes:  R74.8 - Abnormal levels of other serum enzymes


SNOMED:  61972, 241003577, 836118093


(3) Abdominal pain


ICD Codes:  R10.9 - Unspecified abdominal pain


SNOMED:  90839563


(4) Pneumonia


ICD Codes:  J18.9 - Pneumonia, unspecified organism


SNOMED:  127686038


(5) SOB (shortness of breath)


ICD Codes:  R06.02 - Shortness of breath


SNOMED:  769261616


Status:  unchanged


Status Narrative


Discussed with Dr. Luna


Assessment/Plan


Assessment


- resolved abd pain


- resolving N/V


- resolved diarrhea


- possibly viral gastroenteritis





Recommendations


- advance diet 


- follow symptoms


-IV and p.o. hydration plus electrolyte correction





Subjective


Subjective


No recurrent vomiting


Abdominal pain resolved





Objective





Last 24 Hour Vital Signs








  Date Time  Temp Pulse Resp B/P (MAP) Pulse Ox O2 Delivery O2 Flow Rate FiO2


 


7/22/19 08:00 98.6 70 20 100/61 (74) 98   


 


7/22/19 04:20 98.0 83 16 130/84 (99) 95   


 


7/22/19 04:00  69      


 


7/22/19 00:07 97.9 79 17 112/82 (92) 96   


 


7/22/19 00:07  79      


 


7/21/19 21:00      Nasal Cannula 2.0 


 


7/21/19 20:00 98.2 78 16 124/84 (97) 97   


 


7/21/19 20:00  79      


 


7/21/19 16:00 98.3 79 17 127/89 (102) 99   


 


7/21/19 16:00  88      


 


7/21/19 12:00  79      


 


7/21/19 12:00 98.1 80 17 145/81 (102) 99   

















Intake and Output  


 


 7/21/19 7/22/19





 19:00 07:00


 


Intake Total 900 ml 


 


Balance 900 ml 


 


  


 


Intake Oral 900 ml 


 


# Voids 3 4











Laboratory Tests








Test


  7/22/19


04:35


 


White Blood Count


  3.7 K/UL


(4.8-10.8)  L


 


Red Blood Count


  4.85 M/UL


(4.20-5.40)


 


Hemoglobin


  14.7 G/DL


(12.0-16.0)


 


Hematocrit


  43.4 %


(37.0-47.0)


 


Mean Corpuscular Volume 89 FL (80-99)  


 


Mean Corpuscular Hemoglobin


  30.3 PG


(27.0-31.0)


 


Mean Corpuscular Hemoglobin


Concent 33.9 G/DL


(32.0-36.0)


 


Red Cell Distribution Width


  10.1 %


(11.6-14.8)  L


 


Platelet Count


  257 K/UL


(150-450)


 


Mean Platelet Volume


  7.7 FL


(6.5-10.1)


 


Neutrophils (%) (Auto)


  63.8 %


(45.0-75.0)


 


Lymphocytes (%) (Auto)


  15.6 %


(20.0-45.0)  L


 


Monocytes (%) (Auto)


  18.4 %


(1.0-10.0)  H


 


Eosinophils (%) (Auto)


  1.4 %


(0.0-3.0)


 


Basophils (%) (Auto)


  0.8 %


(0.0-2.0)


 


Sodium Level


  138 MMOL/L


(136-145)


 


Potassium Level


  3.2 MMOL/L


(3.5-5.1)  L


 


Chloride Level


  103 MMOL/L


()


 


Carbon Dioxide Level


  25 MMOL/L


(21-32)


 


Anion Gap


  10 mmol/L


(5-15)


 


Blood Urea Nitrogen


  7 mg/dL (7-18)


 


 


Creatinine


  0.6 MG/DL


(0.55-1.30)


 


Estimat Glomerular Filtration


Rate > 60 mL/min


(>60)


 


Glucose Level


  102 MG/DL


()


 


Calcium Level


  8.6 MG/DL


(8.5-10.1)








Height (Feet):  5


Height (Inches):  0.00


Weight (Pounds):  140


General Appearance:  WD/WN, no apparent distress, alert


Cardiovascular:  normal rate


Respiratory/Chest:  normal breath sounds, no respiratory distress


Abdominal Exam:  normal bowel sounds, non tender, soft


Extremities:  normal range of motion, non-tender


Objective


N.p.o. for stress test











Louis Gaytan NP Jul 22, 2019 10:20

## 2019-07-22 NOTE — CARDIOLOGY REPORT
--------------- APPROVED REPORT --------------





EKG Measurement

Heart [id number]

[id number]

[id number]

[id number]

[id number]





Normal sinus rhythm

Low voltage QRS

Nonspecific T wave abnormality

Abnormal ECG

## 2019-07-23 VITALS — DIASTOLIC BLOOD PRESSURE: 66 MMHG | SYSTOLIC BLOOD PRESSURE: 105 MMHG

## 2019-07-23 VITALS — DIASTOLIC BLOOD PRESSURE: 83 MMHG | SYSTOLIC BLOOD PRESSURE: 128 MMHG

## 2019-07-23 VITALS — SYSTOLIC BLOOD PRESSURE: 117 MMHG | DIASTOLIC BLOOD PRESSURE: 62 MMHG

## 2019-07-23 VITALS — DIASTOLIC BLOOD PRESSURE: 95 MMHG | SYSTOLIC BLOOD PRESSURE: 118 MMHG

## 2019-07-23 VITALS — SYSTOLIC BLOOD PRESSURE: 110 MMHG | DIASTOLIC BLOOD PRESSURE: 81 MMHG

## 2019-07-23 VITALS — SYSTOLIC BLOOD PRESSURE: 106 MMHG | DIASTOLIC BLOOD PRESSURE: 79 MMHG

## 2019-07-23 LAB
ADD MANUAL DIFF: YES
ANION GAP SERPL CALC-SCNC: 8 MMOL/L (ref 5–15)
BUN SERPL-MCNC: 11 MG/DL (ref 7–18)
CALCIUM SERPL-MCNC: 8.5 MG/DL (ref 8.5–10.1)
CHLORIDE SERPL-SCNC: 103 MMOL/L (ref 98–107)
CO2 SERPL-SCNC: 24 MMOL/L (ref 21–32)
CREAT SERPL-MCNC: 0.6 MG/DL (ref 0.55–1.3)
ERYTHROCYTE [DISTWIDTH] IN BLOOD BY AUTOMATED COUNT: 10.4 % (ref 11.6–14.8)
HCT VFR BLD CALC: 42.8 % (ref 37–47)
HGB BLD-MCNC: 14.5 G/DL (ref 12–16)
MCV RBC AUTO: 90 FL (ref 80–99)
PLATELET # BLD: 239 K/UL (ref 150–450)
POTASSIUM SERPL-SCNC: 3.6 MMOL/L (ref 3.5–5.1)
RBC # BLD AUTO: 4.77 M/UL (ref 4.2–5.4)
SODIUM SERPL-SCNC: 135 MMOL/L (ref 136–145)
WBC # BLD AUTO: 3.7 K/UL (ref 4.8–10.8)

## 2019-07-23 RX ADMIN — DOCUSATE SODIUM SCH MG: 100 CAPSULE, LIQUID FILLED ORAL at 18:28

## 2019-07-23 RX ADMIN — HEPARIN SODIUM SCH UNITS: 5000 INJECTION INTRAVENOUS; SUBCUTANEOUS at 22:05

## 2019-07-23 RX ADMIN — ASPIRIN 81 MG SCH MG: 81 TABLET ORAL at 08:51

## 2019-07-23 RX ADMIN — PROMETHAZINE HYDROCHLORIDE PRN ML: 6.25 SOLUTION ORAL at 14:16

## 2019-07-23 RX ADMIN — HEPARIN SODIUM SCH UNITS: 5000 INJECTION INTRAVENOUS; SUBCUTANEOUS at 08:54

## 2019-07-23 RX ADMIN — LEVOFLOXACIN SCH MG: 500 TABLET, FILM COATED ORAL at 08:51

## 2019-07-23 RX ADMIN — DOCUSATE SODIUM SCH MG: 100 CAPSULE, LIQUID FILLED ORAL at 08:51

## 2019-07-23 NOTE — GI PROGRESS NOTE
Assessment/Plan


Problems:  


(1) Gastroenteritis


ICD Codes:  K52.9 - Noninfective gastroenteritis and colitis, unspecified


SNOMED:  45953228


(2) Elevated troponin


ICD Codes:  R74.8 - Abnormal levels of other serum enzymes


SNOMED:  894707482, 968696419, 315369269


(3) Abdominal pain


ICD Codes:  R10.9 - Unspecified abdominal pain


SNOMED:  44825539


(4) Pneumonia


ICD Codes:  J18.9 - Pneumonia, unspecified organism


SNOMED:  078683111


(5) SOB (shortness of breath)


ICD Codes:  R06.02 - Shortness of breath


SNOMED:  449505250


Status:  unchanged


Status Narrative


Discussed with Dr. Luna.


Assessment/Plan


Assessment


- resolved abd pain


- resolving N/V


- resolved diarrhea


- possibly viral gastroenteritis





Recommendations


- advance diet 


- symptomatic treatment


- IV and p.o. hydration plus electrolyte correction


- follow up cardiology recs





The patient was seen and examined at bedside and all new and available data was 

reviewed in the patients chart. I agree with the above findings, impression 

and plan.  (Patient seen earlier today. Signature stamp does not reflect 

patient encounter time.). - Ramesh Luna MD





Subjective


Subjective


No recurrent vomiting


Abdominal pain resolved





Objective





Last 24 Hour Vital Signs








  Date Time  Temp Pulse Resp B/P (MAP) Pulse Ox O2 Delivery O2 Flow Rate FiO2


 


7/23/19 04:00 98.4 84 16 118/95 (103) 97   


 


7/23/19 04:00  79      


 


7/23/19 00:00 98.9 76 17 128/83 (98) 97   


 


7/23/19 00:00  80      


 


7/22/19 21:00      Room Air  


 


7/22/19 20:00 98.2 83 16 114/80 (91) 98   


 


7/22/19 20:00  74      


 


7/22/19 16:00 98.6 74 20 106/78 (87) 95   


 


7/22/19 16:00  78      


 


7/22/19 12:00  76      


 


7/22/19 12:00 97.9 64 20 126/86 (99) 94   


 


7/22/19 09:00      Nasal Cannula 2.0 

















Intake and Output  


 


 7/22/19 7/23/19





 19:00 07:00


 


Intake Total 380 ml 600 ml


 


Balance 380 ml 600 ml


 


  


 


Intake Oral 380 ml 600 ml


 


# Voids  5


 


# Bowel Movements 1 1











Laboratory Tests








Test


  7/23/19


05:30


 


White Blood Count


  3.7 K/UL


(4.8-10.8)  L


 


Red Blood Count


  4.77 M/UL


(4.20-5.40)


 


Hemoglobin


  14.5 G/DL


(12.0-16.0)


 


Hematocrit


  42.8 %


(37.0-47.0)


 


Mean Corpuscular Volume 90 FL (80-99)  


 


Mean Corpuscular Hemoglobin


  30.5 PG


(27.0-31.0)


 


Mean Corpuscular Hemoglobin


Concent 33.9 G/DL


(32.0-36.0)


 


Red Cell Distribution Width


  10.4 %


(11.6-14.8)  L


 


Platelet Count


  239 K/UL


(150-450)


 


Mean Platelet Volume


  7.5 FL


(6.5-10.1)


 


Neutrophils (%) (Auto)


  % (45.0-75.0)


 


 


Lymphocytes (%) (Auto)


  % (20.0-45.0)


 


 


Monocytes (%) (Auto)  % (1.0-10.0)  


 


Eosinophils (%) (Auto)  % (0.0-3.0)  


 


Basophils (%) (Auto)  % (0.0-2.0)  


 


Differential Total Cells


Counted 100  


 


 


Neutrophils % (Manual) 63 % (45-75)  


 


Lymphocytes % (Manual) 11 % (20-45)  L


 


Monocytes % (Manual) 22 % (1-10)  H


 


Eosinophils % (Manual) 2 % (0-3)  


 


Basophils % (Manual) 2 % (0-2)  


 


Band Neutrophils 0 % (0-8)  


 


Platelet Estimate Adequate  


 


Platelet Morphology Normal  


 


Sodium Level


  135 MMOL/L


(136-145)  L


 


Potassium Level


  3.6 MMOL/L


(3.5-5.1)


 


Chloride Level


  103 MMOL/L


()


 


Carbon Dioxide Level


  24 MMOL/L


(21-32)


 


Anion Gap


  8 mmol/L


(5-15)


 


Blood Urea Nitrogen


  11 mg/dL


(7-18)


 


Creatinine


  0.6 MG/DL


(0.55-1.30)


 


Estimat Glomerular Filtration


Rate > 60 mL/min


(>60)


 


Glucose Level


  103 MG/DL


()


 


Calcium Level


  8.5 MG/DL


(8.5-10.1)








Height (Feet):  5


Height (Inches):  0.00


Weight (Pounds):  140


General Appearance:  WD/WN, no apparent distress, alert


Cardiovascular:  normal rate


Respiratory/Chest:  normal breath sounds, no respiratory distress


Abdominal Exam:  normal bowel sounds, non tender, soft


Extremities:  normal range of motion, non-tender


Objective


N.p.o. for stress test











Louis Gaytan NP Jul 23, 2019 08:10

## 2019-07-23 NOTE — PULMONOLOGY PROGRESS NOTE
Assessment/Plan


Problems:  


(1) Non-ST elevation (NSTEMI) myocardial infarction


(2) Blindness of both eyes


(3) Gastroenteritis


(4) Elevated troponin


Assessment/Plan


stress study was negative


symptomatic treatment


dc home if ok with cardiology





Subjective


ROS Limited/Unobtainable:  No


Allergies:  


Coded Allergies:  


     No Known Allergies (Unverified , 7/19/19)





Objective





Last 24 Hour Vital Signs








  Date Time  Temp Pulse Resp B/P (MAP) Pulse Ox O2 Delivery O2 Flow Rate FiO2


 


7/23/19 09:00      Room Air  


 


7/23/19 08:00  76      


 


7/23/19 08:00 98.4 91 18 110/81 (91) 97   


 


7/23/19 04:00 98.4 84 16 118/95 (103) 97   


 


7/23/19 04:00  79      


 


7/23/19 00:00 98.9 76 17 128/83 (98) 97   


 


7/23/19 00:00  80      


 


7/22/19 21:00      Room Air  


 


7/22/19 20:00 98.2 83 16 114/80 (91) 98   


 


7/22/19 20:00  74      


 


7/22/19 16:00 98.6 74 20 106/78 (87) 95   


 


7/22/19 16:00  78      


 


7/22/19 12:00  76      


 


7/22/19 12:00 97.9 64 20 126/86 (99) 94   

















Intake and Output  


 


 7/22/19 7/23/19





 19:00 07:00


 


Intake Total 380 ml 600 ml


 


Balance 380 ml 600 ml


 


  


 


Intake Oral 380 ml 600 ml


 


# Voids  5


 


# Bowel Movements 1 1








General Appearance:  WD/WN


HEENT:  normocephalic, atraumatic


Respiratory/Chest:  chest wall non-tender, lungs clear, normal breath sounds


Cardiovascular:  normal peripheral pulses, normal rate, regular rhythm


Abdomen:  normal bowel sounds, soft, non tender, no organomegaly


Genitourinary:  normal external genitalia


Skin:  no ulcers


Neurologic/Psychiatric:  CNs II-XII grossly normal


Laboratory Tests


7/23/19 05:30: 


White Blood Count 3.7L, Red Blood Count 4.77, Hemoglobin 14.5, Hematocrit 42.8, 

Mean Corpuscular Volume 90, Mean Corpuscular Hemoglobin 30.5, Mean Corpuscular 

Hemoglobin Concent 33.9, Red Cell Distribution Width 10.4L, Platelet Count 239, 

Mean Platelet Volume 7.5, Neutrophils (%) (Auto) , Lymphocytes (%) (Auto) , 

Monocytes (%) (Auto) , Eosinophils (%) (Auto) , Basophils (%) (Auto) , 

Differential Total Cells Counted 100, Neutrophils % (Manual) 63, Lymphocytes % (

Manual) 11L, Monocytes % (Manual) 22H, Eosinophils % (Manual) 2, Basophils % (

Manual) 2, Band Neutrophils 0, Platelet Estimate Adequate, Platelet Morphology 

Normal, Sodium Level 135L, Potassium Level 3.6, Chloride Level 103, Carbon 

Dioxide Level 24, Anion Gap 8, Blood Urea Nitrogen 11, Creatinine 0.6, Estimat 

Glomerular Filtration Rate > 60, Glucose Level 103, Calcium Level 8.5





Current Medications








 Medications


  (Trade)  Dose


 Ordered  Sig/Rosalio


 Route


 PRN Reason  Start Time


 Stop Time Status Last Admin


Dose Admin


 


 Acetaminophen


  (Tylenol)  650 mg  Q4H  PRN


 ORAL


 FEVER  7/19/19 17:48


 8/18/19 17:47   


 


 


 Albuterol/


 Ipratropium


  (Albuterol/


 Ipratropium)  3 ml  Q4H  PRN


 HHN


 Shortness of Breath  7/19/19 17:45


 7/24/19 17:44   


 


 


 Aspirin


  (ASA)  81 mg  DAILY


 ORAL


   7/22/19 12:30


 8/21/19 12:29  7/23/19 08:51


 


 


 Dextrose


  (Dextrose 50%)  25 ml  Q30M  PRN


 IV


 Hypoglycemia  7/19/19 17:45


 8/18/19 17:44   


 


 


 Dextrose


  (Dextrose 50%)  50 ml  Q30M  PRN


 IV


 Hypoglycemia  7/19/19 17:45


 8/18/19 17:44   


 


 


 Docusate Sodium


  (Colace)  100 mg  TWICE A  DAY


 ORAL


   7/22/19 18:00


 8/21/19 17:59  7/23/19 08:51


 


 


 Heparin Sodium


  (Porcine)


  (Heparin 5000


 units/ml)  5,000 units  EVERY 12  HOURS


 SUBQ


   7/19/19 21:00


 8/18/19 20:59  7/23/19 08:54


 


 


 Iopamidol


  (Isovue-300


 100ml)  100 ml  NOW  PRN


 INJ


 Radiology Procedure  7/19/19 14:30


     


 


 


 Levofloxacin


  (Levaquin)  500 mg  DAILY


 ORAL


   7/21/19 09:00


 7/28/19 08:59  7/23/19 08:51


 


 


 Morphine Sulfate


  (Morphine


 Sulfate)  2 mg  Q4H  PRN


 IVP


 Severe Pain (Pain Scale 7-10)  7/19/19 17:48


 7/26/19 17:47   


 


 


 Ondansetron HCl


  (Zofran)  4 mg  Q6H  PRN


 IVP


 Nausea & Vomiting  7/19/19 17:45


 8/18/19 17:44  7/23/19 02:51


 


 


 Polyethylene


 Glycol


  (Miralax)  17 gm  DAILYPRN  PRN


 ORAL


 Constipation  7/19/19 17:45


 8/18/19 17:44   


 


 


 Promethazine HCl/


 Codeine


  (Phenergan with


 Codeine)  5 ml  Q6H  PRN


 ORAL


 For Cough  7/22/19 07:15


 8/21/19 07:14  7/22/19 20:54


 


 


 Regadenoson


  (Lexiscan)  0.4 mg  ONCE


 IV


   7/22/19 11:00


 8/22/19 15:00  7/22/19 10:55


 

















Gerald Aguilar MD Jul 23, 2019 11:48

## 2019-07-23 NOTE — GENERAL PROGRESS NOTE
Assessment/Plan


Problem List:  


(1) Elevated troponin


ICD Codes:  R74.8 - Abnormal levels of other serum enzymes


SNOMED:  785738760, 590385590, 544066661


(2) SOB (shortness of breath)


ICD Codes:  R06.02 - Shortness of breath


SNOMED:  875493847


(3) Pneumonia


ICD Codes:  J18.9 - Pneumonia, unspecified organism


SNOMED:  706646754


(4) Abdominal pain


ICD Codes:  R10.9 - Unspecified abdominal pain


SNOMED:  11267383


Status:  stable, progressing


Assessment/Plan:


o2 pulm tx gi id cardio f/u cbc bmp am dc plan w hh





Subjective


Constitutional:  Reports: weakness


Allergies:  


Coded Allergies:  


     No Known Allergies (Unverified , 7/19/19)


All Systems:  reviewed and negative except above


Subjective


calm in bed





Objective





Last 24 Hour Vital Signs








  Date Time  Temp Pulse Resp B/P (MAP) Pulse Ox O2 Delivery O2 Flow Rate FiO2


 


7/23/19 12:00 98.5 96 18 105/66 (79) 98   


 


7/23/19 12:00  86      


 


7/23/19 09:00      Room Air  


 


7/23/19 08:00  76      


 


7/23/19 08:00 98.4 91 18 110/81 (91) 97   


 


7/23/19 04:00 98.4 84 16 118/95 (103) 97   


 


7/23/19 04:00  79      


 


7/23/19 00:00 98.9 76 17 128/83 (98) 97   


 


7/23/19 00:00  80      


 


7/22/19 21:00      Room Air  


 


7/22/19 20:00 98.2 83 16 114/80 (91) 98   


 


7/22/19 20:00  74      

















Intake and Output  


 


 7/22/19 7/23/19





 19:00 07:00


 


Intake Total 380 ml 600 ml


 


Balance 380 ml 600 ml


 


  


 


Intake Oral 380 ml 600 ml


 


# Voids  5


 


# Bowel Movements 1 1








Laboratory Tests


7/23/19 05:30: 


White Blood Count 3.7L, Red Blood Count 4.77, Hemoglobin 14.5, Hematocrit 42.8, 

Mean Corpuscular Volume 90, Mean Corpuscular Hemoglobin 30.5, Mean Corpuscular 

Hemoglobin Concent 33.9, Red Cell Distribution Width 10.4L, Platelet Count 239, 

Mean Platelet Volume 7.5, Neutrophils (%) (Auto) , Lymphocytes (%) (Auto) , 

Monocytes (%) (Auto) , Eosinophils (%) (Auto) , Basophils (%) (Auto) , 

Differential Total Cells Counted 100, Neutrophils % (Manual) 63, Lymphocytes % (

Manual) 11L, Monocytes % (Manual) 22H, Eosinophils % (Manual) 2, Basophils % (

Manual) 2, Band Neutrophils 0, Platelet Estimate Adequate, Platelet Morphology 

Normal, Sodium Level 135L, Potassium Level 3.6, Chloride Level 103, Carbon 

Dioxide Level 24, Anion Gap 8, Blood Urea Nitrogen 11, Creatinine 0.6, Estimat 

Glomerular Filtration Rate > 60, Glucose Level 103, Calcium Level 8.5


Height (Feet):  5


Height (Inches):  0.00


Weight (Pounds):  140


General Appearance:  lethargic


EENT:  normal ENT inspection


Neck:  normal alignment


Cardiovascular:  normal peripheral pulses, normal rate, regular rhythm


Respiratory/Chest:  chest wall non-tender, lungs clear, normal breath sounds


Abdomen:  normal bowel sounds, non tender, soft


Extremities:  normal inspection


Edema:  no edema noted Arm (L), no edema noted Arm (R), no edema noted Leg (L), 

no edema noted Leg (R), no edema noted Pedal (L), no edema noted Pedal (R), no 

edema noted Generalized


Neurologic:  responsive, motor weakness


Skin:  normal pigmentation, warm/dry











Edwin Collins DO Jul 23, 2019 16:19

## 2019-07-23 NOTE — INFECTIOUS DISEASES PROG NOTE
Assessment/Plan


Assessment/Plan





Assessment:


Abd pain, Nausea


 Recent food poisoning vs viral gastroenteritis


PNA (+cough)


  -CT abd/p: The visualized lung bases to the scattered patchy pulmonary 

parenchymal infiltrates, most likely pneumonia. Limited assessment of the GI 

tract, due to lack of enteric contrast administration. No definite acute 

abdominal or pelvic process. Nonspecific mild endometrial thickening and fluid. 

Consider pelvic sonographic correlation as clinically indicated


 Possible collapsed left ovarian follicle. Mild superior endplate depression of 

the L1 vertebral body. Age indeterminate. Consider MRI for better 

characterization if clinically relevant


 Subcentimeter low-attenuation renal lesions, too small to characterize, most 

likely benign simple cortical cysts. No further follow-up necessary.





Afebrile


no leukocytosis


   -CXR: No definite acute process


   -u/a neg





Elevated troponins





legally blindness


L side paralysis





HIV ab screen neg





Plan:


-Continue PO Levaquin #4 (abx d #5/5) for PNA.


    -7/20 SP IV Vancomycin #2, Cefepime #2\


    -7/19 SP Ceftriaxone x1





-f/u cx


-Monitor CBC/CMP, temperatures


-f/u sp cx





Thank you for this consultation. Will continue to follow along with you.





Discussed with RN.





Subjective


Allergies:  


Coded Allergies:  


     No Known Allergies (Unverified , 7/19/19)


Subjective


afebrile


no leukocytosis


at 2l NC





Objective


Vital Signs





Last 24 Hour Vital Signs








  Date Time  Temp Pulse Resp B/P (MAP) Pulse Ox O2 Delivery O2 Flow Rate FiO2


 


7/23/19 09:00      Room Air  


 


7/23/19 08:00  76      


 


7/23/19 08:00 98.4 91 18 110/81 (91) 97   


 


7/23/19 04:00 98.4 84 16 118/95 (103) 97   


 


7/23/19 04:00  79      


 


7/23/19 00:00 98.9 76 17 128/83 (98) 97   


 


7/23/19 00:00  80      


 


7/22/19 21:00      Room Air  


 


7/22/19 20:00 98.2 83 16 114/80 (91) 98   


 


7/22/19 20:00  74      


 


7/22/19 16:00 98.6 74 20 106/78 (87) 95   


 


7/22/19 16:00  78      


 


7/22/19 12:00  76      


 


7/22/19 12:00 97.9 64 20 126/86 (99) 94   








Height (Feet):  5


Height (Inches):  0.00


Weight (Pounds):  140


Objective


GENERAL:  Calm in bed, O2 NC, slight short of breath.  No


complaint.


CARDIOVASCULAR:  No murmur.


LUNGS:  Distant and clear.


ABDOMEN:  Bowel sounds positive.  Nontender.  Nondistended.


EXTREMITIES:  No cyanosis, clubbing, or edema.  Left hand contracture


noted.


NEUROLOGIC:  The patient moves all extremities, slightly weak.





Laboratory Tests








Test


  7/23/19


05:30


 


White Blood Count


  3.7 K/UL


(4.8-10.8)  L


 


Red Blood Count


  4.77 M/UL


(4.20-5.40)


 


Hemoglobin


  14.5 G/DL


(12.0-16.0)


 


Hematocrit


  42.8 %


(37.0-47.0)


 


Mean Corpuscular Volume 90 FL (80-99)  


 


Mean Corpuscular Hemoglobin


  30.5 PG


(27.0-31.0)


 


Mean Corpuscular Hemoglobin


Concent 33.9 G/DL


(32.0-36.0)


 


Red Cell Distribution Width


  10.4 %


(11.6-14.8)  L


 


Platelet Count


  239 K/UL


(150-450)


 


Mean Platelet Volume


  7.5 FL


(6.5-10.1)


 


Neutrophils (%) (Auto)


  % (45.0-75.0)


 


 


Lymphocytes (%) (Auto)


  % (20.0-45.0)


 


 


Monocytes (%) (Auto)  % (1.0-10.0)  


 


Eosinophils (%) (Auto)  % (0.0-3.0)  


 


Basophils (%) (Auto)  % (0.0-2.0)  


 


Differential Total Cells


Counted 100  


 


 


Neutrophils % (Manual) 63 % (45-75)  


 


Lymphocytes % (Manual) 11 % (20-45)  L


 


Monocytes % (Manual) 22 % (1-10)  H


 


Eosinophils % (Manual) 2 % (0-3)  


 


Basophils % (Manual) 2 % (0-2)  


 


Band Neutrophils 0 % (0-8)  


 


Platelet Estimate Adequate  


 


Platelet Morphology Normal  


 


Sodium Level


  135 MMOL/L


(136-145)  L


 


Potassium Level


  3.6 MMOL/L


(3.5-5.1)


 


Chloride Level


  103 MMOL/L


()


 


Carbon Dioxide Level


  24 MMOL/L


(21-32)


 


Anion Gap


  8 mmol/L


(5-15)


 


Blood Urea Nitrogen


  11 mg/dL


(7-18)


 


Creatinine


  0.6 MG/DL


(0.55-1.30)


 


Estimat Glomerular Filtration


Rate > 60 mL/min


(>60)


 


Glucose Level


  103 MG/DL


()


 


Calcium Level


  8.5 MG/DL


(8.5-10.1)











Current Medications








 Medications


  (Trade)  Dose


 Ordered  Sig/Rosalio


 Route


 PRN Reason  Start Time


 Stop Time Status Last Admin


Dose Admin


 


 Acetaminophen


  (Tylenol)  650 mg  Q4H  PRN


 ORAL


 FEVER  7/19/19 17:48


 8/18/19 17:47   


 


 


 Albuterol/


 Ipratropium


  (Albuterol/


 Ipratropium)  3 ml  Q4H  PRN


 HHN


 Shortness of Breath  7/19/19 17:45


 7/24/19 17:44   


 


 


 Aspirin


  (ASA)  81 mg  DAILY


 ORAL


   7/22/19 12:30


 8/21/19 12:29  7/23/19 08:51


 


 


 Dextrose


  (Dextrose 50%)  25 ml  Q30M  PRN


 IV


 Hypoglycemia  7/19/19 17:45


 8/18/19 17:44   


 


 


 Dextrose


  (Dextrose 50%)  50 ml  Q30M  PRN


 IV


 Hypoglycemia  7/19/19 17:45


 8/18/19 17:44   


 


 


 Docusate Sodium


  (Colace)  100 mg  TWICE A  DAY


 ORAL


   7/22/19 18:00


 8/21/19 17:59  7/23/19 08:51


 


 


 Heparin Sodium


  (Porcine)


  (Heparin 5000


 units/ml)  5,000 units  EVERY 12  HOURS


 SUBQ


   7/19/19 21:00


 8/18/19 20:59  7/23/19 08:54


 


 


 Iopamidol


  (Isovue-300


 100ml)  100 ml  NOW  PRN


 INJ


 Radiology Procedure  7/19/19 14:30


     


 


 


 Levofloxacin


  (Levaquin)  500 mg  DAILY


 ORAL


   7/21/19 09:00


 7/28/19 08:59  7/23/19 08:51


 


 


 Morphine Sulfate


  (Morphine


 Sulfate)  2 mg  Q4H  PRN


 IVP


 Severe Pain (Pain Scale 7-10)  7/19/19 17:48


 7/26/19 17:47   


 


 


 Ondansetron HCl


  (Zofran)  4 mg  Q6H  PRN


 IVP


 Nausea & Vomiting  7/19/19 17:45


 8/18/19 17:44  7/23/19 02:51


 


 


 Polyethylene


 Glycol


  (Miralax)  17 gm  DAILYPRN  PRN


 ORAL


 Constipation  7/19/19 17:45


 8/18/19 17:44   


 


 


 Promethazine HCl/


 Codeine


  (Phenergan with


 Codeine)  5 ml  Q6H  PRN


 ORAL


 For Cough  7/22/19 07:15


 8/21/19 07:14  7/22/19 20:54


 


 


 Regadenoson


  (Lexiscan)  0.4 mg  ONCE


 IV


   7/22/19 11:00


 8/22/19 15:00  7/22/19 10:55


 

















Erica Meraz M.D. Jul 23, 2019 11:47

## 2019-07-23 NOTE — NUR
NURSE NOTES:

Informed Dr. Meraz Pt's antibiotic order for her eyes before coming here. MD visit Pt.

## 2019-07-23 NOTE — NUR
NURSE NOTES:

Pt. unable to open eyes. Said she has an eye infection and was given an eye antibiotics 
before coming here but was unable to identify name of medication. Will follow up.

## 2019-07-23 NOTE — NUR
NURSE NOTES:

Received patient from [facility], Patient is awake and oriented x4, patient is blind in both 
eyes. IV site is  Right hand 20g, patent and asymptomatic. Bed is locked, placed in lowest 
position, side rails up x3, patient is able to demonstrate how to use call light that is in 
reach. Will continue to monitor.

## 2019-07-23 NOTE — CARDIOLOGY PROGRESS NOTE
Assessment/Plan


Assessment/Plan


1. Elevated troponin I level in this patient likely due to sepsis, no wall 

motion abnormalities seen on 2D echo, nuclear stress test is non-ischemic. 2D 

echo also reveals LVEF of 65%.


2. Legally blind.


3. History of food poisoning.


4. History of left upper extremity paralysis.





Subjective


Subjective


Sinus rhythm at rate of 86.





Objective





Last 24 Hour Vital Signs








  Date Time  Temp Pulse Resp B/P (MAP) Pulse Ox O2 Delivery O2 Flow Rate FiO2


 


7/23/19 21:00      Room Air  


 


7/23/19 20:02  86      


 


7/23/19 20:00 97.7 91 20 106/79 (88) 92   


 


7/23/19 16:00  84      


 


7/23/19 16:00 98.1 99 18 117/62 (80) 99   


 


7/23/19 12:00 98.5 96 18 105/66 (79) 98   


 


7/23/19 12:00  86      


 


7/23/19 09:00      Room Air  


 


7/23/19 08:00  76      


 


7/23/19 08:00 98.4 91 18 110/81 (91) 97   


 


7/23/19 04:00 98.4 84 16 118/95 (103) 97   


 


7/23/19 04:00  79      


 


7/23/19 00:00 98.9 76 17 128/83 (98) 97   


 


7/23/19 00:00  80      

















Intake and Output  


 


 7/22/19 7/23/19





 18:59 06:59


 


Intake Total 380 ml 600 ml


 


Balance 380 ml 600 ml


 


  


 


Intake Oral 380 ml 600 ml


 


# Voids  5


 


# Bowel Movements 1 1








2D Echo:   LVEF 55%, Mild LVH, RVSP 7 mmHg, Grade I LVDD





Laboratory Tests








Test


  7/23/19


05:30


 


White Blood Count


  3.7 K/UL


(4.8-10.8)  L


 


Red Blood Count


  4.77 M/UL


(4.20-5.40)


 


Hemoglobin


  14.5 G/DL


(12.0-16.0)


 


Hematocrit


  42.8 %


(37.0-47.0)


 


Mean Corpuscular Volume 90 FL (80-99)  


 


Mean Corpuscular Hemoglobin


  30.5 PG


(27.0-31.0)


 


Mean Corpuscular Hemoglobin


Concent 33.9 G/DL


(32.0-36.0)


 


Red Cell Distribution Width


  10.4 %


(11.6-14.8)  L


 


Platelet Count


  239 K/UL


(150-450)


 


Mean Platelet Volume


  7.5 FL


(6.5-10.1)


 


Neutrophils (%) (Auto)


  % (45.0-75.0)


 


 


Lymphocytes (%) (Auto)


  % (20.0-45.0)


 


 


Monocytes (%) (Auto)  % (1.0-10.0)  


 


Eosinophils (%) (Auto)  % (0.0-3.0)  


 


Basophils (%) (Auto)  % (0.0-2.0)  


 


Differential Total Cells


Counted 100  


 


 


Neutrophils % (Manual) 63 % (45-75)  


 


Lymphocytes % (Manual) 11 % (20-45)  L


 


Monocytes % (Manual) 22 % (1-10)  H


 


Eosinophils % (Manual) 2 % (0-3)  


 


Basophils % (Manual) 2 % (0-2)  


 


Band Neutrophils 0 % (0-8)  


 


Platelet Estimate Adequate  


 


Platelet Morphology Normal  


 


Sodium Level


  135 MMOL/L


(136-145)  L


 


Potassium Level


  3.6 MMOL/L


(3.5-5.1)


 


Chloride Level


  103 MMOL/L


()


 


Carbon Dioxide Level


  24 MMOL/L


(21-32)


 


Anion Gap


  8 mmol/L


(5-15)


 


Blood Urea Nitrogen


  11 mg/dL


(7-18)


 


Creatinine


  0.6 MG/DL


(0.55-1.30)


 


Estimat Glomerular Filtration


Rate > 60 mL/min


(>60)


 


Glucose Level


  103 MG/DL


()


 


Calcium Level


  8.5 MG/DL


(8.5-10.1)








Objective


HEENT: Atraumatic, anicteric, PERRLA, EOMI, B/L Blindness


NECK: JVP <5 cm, No carotid bruit, carotid upstroke 2+ B/L


CVS: Normal S1S2, No murmurs, gallops or rubs, RRR


LUNGS: Clear to auscultation.


ABDOMEN: Soft non-tender, non-distended, no hepatosplenomegaly, +BS


EXT: No evidence of edema, clubbing or cyanosis.











Asad Campoverde MD Jul 23, 2019 23:10

## 2019-07-23 NOTE — NUR
NURSE NOTES:

Report received from GLORY Flores. Patient asleep comfortably. No breathing distress noted. In 
RA. Bed on lowest position, side rails upx2, brakes engaged. Call light placed next to R 
hand for easy reach.

## 2019-07-23 NOTE — NUR
**DISCHARGE PLAN

PLAN IS TO DISCHARGE HOME ONCE NURSING RECEIVES CLEARANCE FROM CARDIOLOGIST

 HAS REFERRED PATIENT TO



[facility]

T: [phone]

F: [phone]

## 2019-07-23 NOTE — NUR
NURSE NOTES:

DR DE ANDA NOTIFIED THAT DR BRUNOSN CLEARED PT, HE WANTS THE PT WALK FIRST AND IF STABLE PT CAN 
BE DISCHARGE, INFORMED PT THAT PT WAS TRYING TO WALK WITH PT AND SHE WAS COMPLAINING OF 
BEING DIZZY IN STANDING AND WITH PAIN, NOTIFIED DR DE ANDA AND HE SAID TO GET CLEARANCE FROM 
DR GUTIERREZ AND PT, DC PLAN IN AM.

## 2019-07-23 NOTE — NUR
CASE MANAGEMENT:REVIEW



7/23/19

SI: NSTEMI

98.5   86  18  105/66  98% ON RA



IS: ASA PO QD

LEVAQUIN PO QD

HEPARIN SQ Q12

IV ZOFRAN Q6HRS PRN

**: TELEMETRY STATUS

DCP: FROM HOME



PLAN:

DISCHARGE PLANNING FOR HOME WITH "[facility]"

## 2019-07-24 VITALS — DIASTOLIC BLOOD PRESSURE: 76 MMHG | SYSTOLIC BLOOD PRESSURE: 109 MMHG

## 2019-07-24 VITALS — SYSTOLIC BLOOD PRESSURE: 110 MMHG | DIASTOLIC BLOOD PRESSURE: 70 MMHG

## 2019-07-24 VITALS — SYSTOLIC BLOOD PRESSURE: 117 MMHG | DIASTOLIC BLOOD PRESSURE: 70 MMHG

## 2019-07-24 VITALS — SYSTOLIC BLOOD PRESSURE: 111 MMHG | DIASTOLIC BLOOD PRESSURE: 74 MMHG

## 2019-07-24 VITALS — SYSTOLIC BLOOD PRESSURE: 127 MMHG | DIASTOLIC BLOOD PRESSURE: 78 MMHG

## 2019-07-24 VITALS — DIASTOLIC BLOOD PRESSURE: 90 MMHG | SYSTOLIC BLOOD PRESSURE: 119 MMHG

## 2019-07-24 LAB
ADD MANUAL DIFF: NO
ALBUMIN SERPL-MCNC: 2.7 G/DL (ref 3.4–5)
ALBUMIN/GLOB SERPL: 0.6 {RATIO} (ref 1–2.7)
ALP SERPL-CCNC: 323 U/L (ref 46–116)
ALT SERPL-CCNC: 543 U/L (ref 12–78)
ANION GAP SERPL CALC-SCNC: 11 MMOL/L (ref 5–15)
AST SERPL-CCNC: 601 U/L (ref 15–37)
BASOPHILS NFR BLD AUTO: 1.1 % (ref 0–2)
BILIRUB SERPL-MCNC: 0.7 MG/DL (ref 0.2–1)
BUN SERPL-MCNC: 13 MG/DL (ref 7–18)
CALCIUM SERPL-MCNC: 8 MG/DL (ref 8.5–10.1)
CHLORIDE SERPL-SCNC: 103 MMOL/L (ref 98–107)
CO2 SERPL-SCNC: 22 MMOL/L (ref 21–32)
CREAT SERPL-MCNC: 0.5 MG/DL (ref 0.55–1.3)
EOSINOPHIL NFR BLD AUTO: 1.8 % (ref 0–3)
ERYTHROCYTE [DISTWIDTH] IN BLOOD BY AUTOMATED COUNT: 10.6 % (ref 11.6–14.8)
GLOBULIN SER-MCNC: 4.3 G/DL
HCT VFR BLD CALC: 42.4 % (ref 37–47)
HGB BLD-MCNC: 14.3 G/DL (ref 12–16)
LYMPHOCYTES NFR BLD AUTO: 14.1 % (ref 20–45)
MCV RBC AUTO: 89 FL (ref 80–99)
MONOCYTES NFR BLD AUTO: 17.5 % (ref 1–10)
NEUTROPHILS NFR BLD AUTO: 65.4 % (ref 45–75)
PLATELET # BLD: 240 K/UL (ref 150–450)
POTASSIUM SERPL-SCNC: 3.5 MMOL/L (ref 3.5–5.1)
RBC # BLD AUTO: 4.75 M/UL (ref 4.2–5.4)
SODIUM SERPL-SCNC: 135 MMOL/L (ref 136–145)
WBC # BLD AUTO: 4.7 K/UL (ref 4.8–10.8)

## 2019-07-24 RX ADMIN — PROMETHAZINE HYDROCHLORIDE PRN ML: 6.25 SOLUTION ORAL at 21:42

## 2019-07-24 RX ADMIN — DOCUSATE SODIUM SCH MG: 100 CAPSULE, LIQUID FILLED ORAL at 17:27

## 2019-07-24 RX ADMIN — HEPARIN SODIUM SCH UNITS: 5000 INJECTION INTRAVENOUS; SUBCUTANEOUS at 09:08

## 2019-07-24 RX ADMIN — HEPARIN SODIUM SCH UNITS: 5000 INJECTION INTRAVENOUS; SUBCUTANEOUS at 21:41

## 2019-07-24 RX ADMIN — Medication SCH DROP: at 21:40

## 2019-07-24 RX ADMIN — DOCUSATE SODIUM SCH MG: 100 CAPSULE, LIQUID FILLED ORAL at 09:09

## 2019-07-24 RX ADMIN — ASPIRIN 81 MG SCH MG: 81 TABLET ORAL at 09:09

## 2019-07-24 NOTE — PULMONOLOGY PROGRESS NOTE
Assessment/Plan


Problems:  


(1) Non-ST elevation (NSTEMI) myocardial infarction


(2) Blindness of both eyes


(3) Gastroenteritis


(4) Elevated troponin


Assessment/Plan


stress study was negative


symptomatic treatment


dc home if ok with cardiology





Subjective


ROS Limited/Unobtainable:  No


Constitutional:  Reports: no symptoms


HEENT:  Repors: no symptoms


Respiratory:  Reports: no symptoms


Allergies:  


Coded Allergies:  


     No Known Allergies (Unverified , 7/19/19)





Objective





Last 24 Hour Vital Signs








  Date Time  Temp Pulse Resp B/P (MAP) Pulse Ox O2 Delivery O2 Flow Rate FiO2


 


7/24/19 09:00      Room Air  


 


7/24/19 08:31  89 20  100 Room Air  21


 


7/24/19 08:27  87 22  98 Room Air  21


 


7/24/19 08:26  87 22  98 Room Air  21


 


7/24/19 08:00 97.9 92 20 109/76 (87) 95   


 


7/24/19 04:00 97.7 101 20 119/90 (100) 95   


 


7/24/19 03:24  87      


 


7/24/19 00:00 97.7 90 20 127/78 (94) 95   


 


7/23/19 23:29  92      


 


7/23/19 21:00      Room Air  


 


7/23/19 20:02  86      


 


7/23/19 20:00 97.7 91 20 106/79 (88) 92   


 


7/23/19 16:00  84      


 


7/23/19 16:00 98.1 99 18 117/62 (80) 99   


 


7/23/19 12:00 98.5 96 18 105/66 (79) 98   


 


7/23/19 12:00  86      

















Intake and Output  


 


 7/23/19 7/24/19





 19:00 07:00


 


Intake Total 540 ml 


 


Output Total  400 ml


 


Balance 540 ml -400 ml


 


  


 


Intake Oral 540 ml 


 


Output Urine Total  400 ml


 


# Voids 4 3


 


# Bowel Movements 1 








General Appearance:  WD/WN


HEENT:  normocephalic, atraumatic


Respiratory/Chest:  chest wall non-tender, lungs clear, normal breath sounds


Cardiovascular:  normal peripheral pulses, normal rate


Abdomen:  normal bowel sounds, soft, non tender


Genitourinary:  normal external genitalia


Skin:  no rash


Laboratory Tests


7/24/19 06:08: 


White Blood Count 4.7L, Red Blood Count 4.75, Hemoglobin 14.3, Hematocrit 42.4, 

Mean Corpuscular Volume 89, Mean Corpuscular Hemoglobin 30.0, Mean Corpuscular 

Hemoglobin Concent 33.6, Red Cell Distribution Width 10.6L, Platelet Count 240, 

Mean Platelet Volume 7.3, Neutrophils (%) (Auto) 65.4, Lymphocytes (%) (Auto) 

14.1L, Monocytes (%) (Auto) 17.5H, Eosinophils (%) (Auto) 1.8, Basophils (%) (

Auto) 1.1, Sodium Level 135L, Potassium Level 3.5, Chloride Level 103, Carbon 

Dioxide Level 22, Anion Gap 11, Blood Urea Nitrogen 13, Creatinine 0.5L, 

Estimat Glomerular Filtration Rate > 60, Glucose Level 100, Calcium Level 8.0L, 

Total Bilirubin 0.7, Aspartate Amino Transf (AST/SGOT) 601H, Alanine 

Aminotransferase (ALT/SGPT) 543H, Alkaline Phosphatase 323H, Total Protein 7.0, 

Albumin 2.7L, Globulin 4.3, Albumin/Globulin Ratio 0.6L





Current Medications








 Medications


  (Trade)  Dose


 Ordered  Sig/Rosalio


 Route


 PRN Reason  Start Time


 Stop Time Status Last Admin


Dose Admin


 


 Acetaminophen


  (Tylenol)  650 mg  Q4H  PRN


 ORAL


 Mild Pain/Temp > 100.5  7/24/19 09:48


 8/18/19 17:47  7/24/19 09:24


 


 


 Albuterol/


 Ipratropium


  (Albuterol/


 Ipratropium)  3 ml  Q4H  PRN


 HHN


 Shortness of Breath  7/19/19 17:45


 7/24/19 17:44  7/24/19 08:24


 


 


 Aspirin


  (ASA)  81 mg  DAILY


 ORAL


   7/22/19 12:30


 8/21/19 12:29  7/24/19 09:09


 


 


 Dextrose


  (Dextrose 50%)  25 ml  Q30M  PRN


 IV


 Hypoglycemia  7/19/19 17:45


 8/18/19 17:44   


 


 


 Dextrose


  (Dextrose 50%)  50 ml  Q30M  PRN


 IV


 Hypoglycemia  7/19/19 17:45


 8/18/19 17:44   


 


 


 Docusate Sodium


  (Colace)  100 mg  TWICE A  DAY


 ORAL


   7/22/19 18:00


 8/21/19 17:59  7/24/19 09:09


 


 


 Heparin Sodium


  (Porcine)


  (Heparin 5000


 units/ml)  5,000 units  EVERY 12  HOURS


 SUBQ


   7/19/19 21:00


 8/18/19 20:59  7/24/19 09:08


 


 


 Morphine Sulfate


  (Morphine


 Sulfate)  2 mg  Q4H  PRN


 IVP


 Severe Pain (Pain Scale 7-10)  7/19/19 17:48


 7/26/19 17:47   


 


 


 Ondansetron HCl


  (Zofran)  4 mg  Q6H  PRN


 IVP


 Nausea & Vomiting  7/19/19 17:45


 8/18/19 17:44  7/24/19 04:40


 


 


 Patient Own


 Medication


  (Patient's Own


 Med)  2 ea  Q4HR


 BOTH EYES


   7/23/19 17:00


 8/22/19 16:59  7/24/19 09:12


 


 


 Polyethylene


 Glycol


  (Miralax)  17 gm  DAILYPRN  PRN


 ORAL


 Constipation  7/19/19 17:45


 8/18/19 17:44   


 


 


 Promethazine HCl/


 Codeine


  (Phenergan with


 Codeine)  5 ml  Q6H  PRN


 ORAL


 For Cough  7/22/19 07:15


 8/21/19 07:14  7/23/19 14:16


 

















Gerald Aguilar MD Jul 24, 2019 10:49

## 2019-07-24 NOTE — NUR
NURSE NOTES:

Received pt and report from GLORY Oro. Observed pt resting in bed with both eyes closed. Pt 
is blind bilaterally. Arousable by voice. Pt has a med surg order; awaiting for bed 
assignment. Cardiac monitor removed, IV site intact, asymptomatic and patent. Bed is in the 
lowest position and locked. Call light within reach. No signs or symptoms of acute distress 
noted at this time. Will continue plan of care.

## 2019-07-24 NOTE — INFECTIOUS DISEASES PROG NOTE
Assessment/Plan


Assessment/Plan





Assessment:


Abd pain, Nausea


 Recent food poisoning vs viral gastroenteritis


PNA (+cough)


  -CT abd/p: The visualized lung bases to the scattered patchy pulmonary 

parenchymal infiltrates, most likely pneumonia. Limited assessment of the GI 

tract, due to lack of enteric contrast administration. No definite acute 

abdominal or pelvic process. Nonspecific mild endometrial thickening and fluid. 

Consider pelvic sonographic correlation as clinically indicated


 Possible collapsed left ovarian follicle. Mild superior endplate depression of 

the L1 vertebral body. Age indeterminate. Consider MRI for better 

characterization if clinically relevant


 Subcentimeter low-attenuation renal lesions, too small to characterize, most 

likely benign simple cortical cysts. No further follow-up necessary.





Afebrile


no leukocytosis


   -CXR: No definite acute process


   -u/a neg





Elevated troponins





legally blindness


L side paralysis





HIV ab screen neg





Plan:


-Continue to monitor off abx


    -7/23 SP Levaquin #4


    -7/20 SP IV Vancomycin #2, Cefepime #2\


    -7/19 SP Ceftriaxone x1





-f/u cx


-Monitor CBC/CMP, temperatures








Thank you for this consultation. Will continue to follow along with you.





Discussed with RN.





Subjective


Allergies:  


Coded Allergies:  


     No Known Allergies (Unverified , 7/19/19)


Subjective


afebrile


no leukocytosis


at 2l NC





Objective


Vital Signs





Last 24 Hour Vital Signs








  Date Time  Temp Pulse Resp B/P (MAP) Pulse Ox O2 Delivery O2 Flow Rate FiO2


 


7/24/19 12:00 98.3 87 20 111/74 (86) 96   


 


7/24/19 12:00  81      


 


7/24/19 09:00      Room Air  


 


7/24/19 08:31  89 20  100 Room Air  21


 


7/24/19 08:27  87 22  98 Room Air  21


 


7/24/19 08:26  87 22  98 Room Air  21


 


7/24/19 08:00  79      


 


7/24/19 08:00 97.9 92 20 109/76 (87) 95   


 


7/24/19 04:00 97.7 101 20 119/90 (100) 95   


 


7/24/19 03:24  87      


 


7/24/19 00:00 97.7 90 20 127/78 (94) 95   


 


7/23/19 23:29  92      


 


7/23/19 21:00      Room Air  


 


7/23/19 20:02  86      


 


7/23/19 20:00 97.7 91 20 106/79 (88) 92   


 


7/23/19 16:00  84      


 


7/23/19 16:00 98.1 99 18 117/62 (80) 99   








Height (Feet):  5


Height (Inches):  0.00


Weight (Pounds):  141


Objective


GENERAL:  Calm in bed, O2 NC, slight short of breath.  No


complaint.


CARDIOVASCULAR:  No murmur.


LUNGS:  Distant and clear.


ABDOMEN:  Bowel sounds positive.  Nontender.  Nondistended.


EXTREMITIES:  No cyanosis, clubbing, or edema.  Left hand contracture


noted.


NEUROLOGIC:  The patient moves all extremities, slightly weak.





Laboratory Tests








Test


  7/24/19


06:08


 


White Blood Count


  4.7 K/UL


(4.8-10.8)  L


 


Red Blood Count


  4.75 M/UL


(4.20-5.40)


 


Hemoglobin


  14.3 G/DL


(12.0-16.0)


 


Hematocrit


  42.4 %


(37.0-47.0)


 


Mean Corpuscular Volume 89 FL (80-99)  


 


Mean Corpuscular Hemoglobin


  30.0 PG


(27.0-31.0)


 


Mean Corpuscular Hemoglobin


Concent 33.6 G/DL


(32.0-36.0)


 


Red Cell Distribution Width


  10.6 %


(11.6-14.8)  L


 


Platelet Count


  240 K/UL


(150-450)


 


Mean Platelet Volume


  7.3 FL


(6.5-10.1)


 


Neutrophils (%) (Auto)


  65.4 %


(45.0-75.0)


 


Lymphocytes (%) (Auto)


  14.1 %


(20.0-45.0)  L


 


Monocytes (%) (Auto)


  17.5 %


(1.0-10.0)  H


 


Eosinophils (%) (Auto)


  1.8 %


(0.0-3.0)


 


Basophils (%) (Auto)


  1.1 %


(0.0-2.0)


 


Sodium Level


  135 MMOL/L


(136-145)  L


 


Potassium Level


  3.5 MMOL/L


(3.5-5.1)


 


Chloride Level


  103 MMOL/L


()


 


Carbon Dioxide Level


  22 MMOL/L


(21-32)


 


Anion Gap


  11 mmol/L


(5-15)


 


Blood Urea Nitrogen


  13 mg/dL


(7-18)


 


Creatinine


  0.5 MG/DL


(0.55-1.30)  L


 


Estimat Glomerular Filtration


Rate > 60 mL/min


(>60)


 


Glucose Level


  100 MG/DL


()


 


Calcium Level


  8.0 MG/DL


(8.5-10.1)  L


 


Total Bilirubin


  0.7 MG/DL


(0.2-1.0)


 


Aspartate Amino Transf


(AST/SGOT) 601 U/L


(15-37)  H


 


Alanine Aminotransferase


(ALT/SGPT) 543 U/L


(12-78)  H


 


Alkaline Phosphatase


  323 U/L


()  H


 


Total Protein


  7.0 G/DL


(6.4-8.2)


 


Albumin


  2.7 G/DL


(3.4-5.0)  L


 


Globulin 4.3 g/dL  


 


Albumin/Globulin Ratio


  0.6 (1.0-2.7)


L











Current Medications








 Medications


  (Trade)  Dose


 Ordered  Sig/Rosalio


 Route


 PRN Reason  Start Time


 Stop Time Status Last Admin


Dose Admin


 


 Acetaminophen


  (Tylenol)  650 mg  Q4H  PRN


 ORAL


 Mild Pain/Temp > 100.5  7/24/19 09:48


 8/18/19 17:47  7/24/19 09:24


 


 


 Albuterol/


 Ipratropium


  (Albuterol/


 Ipratropium)  3 ml  Q4H  PRN


 HHN


 Shortness of Breath  7/19/19 17:45


 7/24/19 17:44  7/24/19 08:24


 


 


 Aspirin


  (ASA)  81 mg  DAILY


 ORAL


   7/22/19 12:30


 8/21/19 12:29  7/24/19 09:09


 


 


 Dextrose


  (Dextrose 50%)  25 ml  Q30M  PRN


 IV


 Hypoglycemia  7/19/19 17:45


 8/18/19 17:44   


 


 


 Dextrose


  (Dextrose 50%)  50 ml  Q30M  PRN


 IV


 Hypoglycemia  7/19/19 17:45


 8/18/19 17:44   


 


 


 Docusate Sodium


  (Colace)  100 mg  TWICE A  DAY


 ORAL


   7/22/19 18:00


 8/21/19 17:59  7/24/19 09:09


 


 


 Heparin Sodium


  (Porcine)


  (Heparin 5000


 units/ml)  5,000 units  EVERY 12  HOURS


 SUBQ


   7/19/19 21:00


 8/18/19 20:59  7/24/19 09:08


 


 


 Morphine Sulfate


  (Morphine


 Sulfate)  2 mg  Q4H  PRN


 IVP


 Severe Pain (Pain Scale 7-10)  7/19/19 17:48


 7/26/19 17:47   


 


 


 Ondansetron HCl


  (Zofran)  4 mg  Q6H  PRN


 IVP


 Nausea & Vomiting  7/19/19 17:45


 8/18/19 17:44  7/24/19 04:40


 


 


 Patient Own


 Medication


  (Patient's Own


 Med)  2 ea  Q4HR


 BOTH EYES


   7/23/19 17:00


 8/22/19 16:59  7/24/19 13:28


 


 


 Polyethylene


 Glycol


  (Miralax)  17 gm  DAILYPRN  PRN


 ORAL


 Constipation  7/19/19 17:45


 8/18/19 17:44   


 


 


 Promethazine HCl/


 Codeine


  (Phenergan with


 Codeine)  5 ml  Q6H  PRN


 ORAL


 For Cough  7/22/19 07:15


 8/21/19 07:14  7/23/19 14:16


 

















Erica Meraz M.D. Jul 24, 2019 14:06

## 2019-07-24 NOTE — NUR
***DISCHARGE PLAN

INITIAL DISCHARGE PLAN WAS FOR HOME WITH HOME HEALTH

PLAN HAS NOW ABRUPTLY CHANGED TO SNF PLACEMENT



  HAS FAXED CLINICALS TO

[facility] T: [phone]



[facility] T: [phone]



[facility] T: [phone]



[facility] T: [phone]



[facility] T:[phone]

## 2019-07-24 NOTE — NUR
***DISCHARGE PLAN

FAXED TO

[facility] ~ SW CAMMIE ~ NO FEMALE BEDS

[facility] ~ SW WATSON ~ SHE WILL CALL BACK

Replaced by [facility] ~ SW TREVON ~ HE WILL CALL BACK

[facility] ~ SW CANDI ~ DON;T HAVE CONTRACT WITH INSURANCE

[facility] ~ SW [place] ~ NO FEMALE BEDS

[facility] ~ SW LUIS ~ UNABLE TO MEET NEEDS OF A BLIND PERSON

[facility] ~ SW LU ~ SHE WILL CALL BACK

[facility] ~ SW BETTY ~ DON HAS GONE FOR THE DAY. WILL CALL US TOMORROW

[facility] ~ SW ROBIN ~ THEY DO NOT HAVE A CONTRACT WITH INSURANCE

[facility] ~ LEFT MESSAGE FOR ANJU ~ WAITING FOR CALL BACK

## 2019-07-24 NOTE — CARDIOLOGY PROGRESS NOTE
Assessment/Plan


Assessment/Plan


1. Elevated troponin I level in this patient likely due to sepsis, no wall 

motion abnormalities seen on 2D echo, nuclear stress test is non-ischemic. 2D 

echo also reveals LVEF of 65%.


2. Legally blind.


3. History of food poisoning.


4. History of left upper extremity paralysis.





Subjective


Subjective


Sinus rhythm at rate of 86.





Objective





Last 24 Hour Vital Signs








  Date Time  Temp Pulse Resp B/P (MAP) Pulse Ox O2 Delivery O2 Flow Rate FiO2


 


7/24/19 16:00 98.3 97 20 110/70 (83) 98   


 


7/24/19 12:00 98.3 87 20 111/74 (86) 96   


 


7/24/19 12:00  81      


 


7/24/19 09:00      Room Air  


 


7/24/19 08:31  89 20  100 Room Air  21


 


7/24/19 08:27  87 22  98 Room Air  21


 


7/24/19 08:26  87 22  98 Room Air  21


 


7/24/19 08:00  79      


 


7/24/19 08:00 97.9 92 20 109/76 (87) 95   


 


7/24/19 04:00 97.7 101 20 119/90 (100) 95   


 


7/24/19 03:24  87      


 


7/24/19 00:00 97.7 90 20 127/78 (94) 95   


 


7/23/19 23:29  92      


 


7/23/19 21:00      Room Air  

















Intake and Output  


 


 7/23/19 7/24/19





 19:00 07:00


 


Intake Total 540 ml 


 


Output Total  400 ml


 


Balance 540 ml -400 ml


 


  


 


Intake Oral 540 ml 


 


Output Urine Total  400 ml


 


# Voids 4 3


 


# Bowel Movements 1 











Laboratory Tests








Test


  7/24/19


06:08


 


White Blood Count


  4.7 K/UL


(4.8-10.8)  L


 


Red Blood Count


  4.75 M/UL


(4.20-5.40)


 


Hemoglobin


  14.3 G/DL


(12.0-16.0)


 


Hematocrit


  42.4 %


(37.0-47.0)


 


Mean Corpuscular Volume 89 FL (80-99)  


 


Mean Corpuscular Hemoglobin


  30.0 PG


(27.0-31.0)


 


Mean Corpuscular Hemoglobin


Concent 33.6 G/DL


(32.0-36.0)


 


Red Cell Distribution Width


  10.6 %


(11.6-14.8)  L


 


Platelet Count


  240 K/UL


(150-450)


 


Mean Platelet Volume


  7.3 FL


(6.5-10.1)


 


Neutrophils (%) (Auto)


  65.4 %


(45.0-75.0)


 


Lymphocytes (%) (Auto)


  14.1 %


(20.0-45.0)  L


 


Monocytes (%) (Auto)


  17.5 %


(1.0-10.0)  H


 


Eosinophils (%) (Auto)


  1.8 %


(0.0-3.0)


 


Basophils (%) (Auto)


  1.1 %


(0.0-2.0)


 


Sodium Level


  135 MMOL/L


(136-145)  L


 


Potassium Level


  3.5 MMOL/L


(3.5-5.1)


 


Chloride Level


  103 MMOL/L


()


 


Carbon Dioxide Level


  22 MMOL/L


(21-32)


 


Anion Gap


  11 mmol/L


(5-15)


 


Blood Urea Nitrogen


  13 mg/dL


(7-18)


 


Creatinine


  0.5 MG/DL


(0.55-1.30)  L


 


Estimat Glomerular Filtration


Rate > 60 mL/min


(>60)


 


Glucose Level


  100 MG/DL


()


 


Calcium Level


  8.0 MG/DL


(8.5-10.1)  L


 


Total Bilirubin


  0.7 MG/DL


(0.2-1.0)


 


Aspartate Amino Transf


(AST/SGOT) 601 U/L


(15-37)  H


 


Alanine Aminotransferase


(ALT/SGPT) 543 U/L


(12-78)  H


 


Alkaline Phosphatase


  323 U/L


()  H


 


Total Protein


  7.0 G/DL


(6.4-8.2)


 


Albumin


  2.7 G/DL


(3.4-5.0)  L


 


Globulin 4.3 g/dL  


 


Albumin/Globulin Ratio


  0.6 (1.0-2.7)


L








Objective


HEENT: Atraumatic, anicteric, PERRLA, EOMI, B/L Blindness


NECK: JVP <5 cm, No carotid bruit, carotid upstroke 2+ B/L


CVS: Normal S1S2, No murmurs, gallops or rubs, RRR


LUNGS: Clear to auscultation.


ABDOMEN: Soft non-tender, non-distended, no hepatosplenomegaly, +BS


EXT: No evidence of edema, clubbing or cyanosis.











Asad Campoverde MD Jul 24, 2019 20:51

## 2019-07-24 NOTE — NUR
HAND-OFF: 

Report given to GLORY Flores. The patient is resting on the bed without acute distress or 
shortness of breath. The patient's bed in the lowest position, call light in reach, and fall 
and aspiration precaution reinforced. Endorsed plan of care.

## 2019-07-24 NOTE — NUR
NURSE NOTES:

Per Dr. Campoverde's other nursing order, the patient is okay to be discharged at cardiac stand 
point. Will continue plan of care.

## 2019-07-24 NOTE — NUR
*-* INSURANCE *-*



ALL CLINICALS HAVE BEEN FAXED TO:



[facility] WILL TRACK THIS ADMIT

PLEASE FAX THE REVIEW/CLINICAL

P- [phone]

F- ...REVIEW/CLINICAL

-------------------------------------------------------------------------------

Addendum: 07/25/19 at 0942 by ASIF DAVISON LVN LVN

-------------------------------------------------------------------------------

[facility]

[place]: [facility]

T: [phone] 

REF# [id number]

## 2019-07-24 NOTE — NUR
***DISCHARGE PLANNING

PATIENT WAS CLEARED BY CARDIOLOGY YESTERDAY FOR DISCHARGE.

DR DE ANDA WAS NOTIFIED BY NURSING BUT WOULD NOT GIVE DISCHARGE ORDER

PATIENT HAS BEEN REFERRED TO "[facility]"

## 2019-07-24 NOTE — NUR
NURSE NOTES:

Notified JOSÉ MIGUEL Cortez regarding elevation of AST and ALT. Per JOSÉ MIGUEL Cortez no new order but follow 
up with PCP with blood test as soon as possible. 

Notified Dr. Campoverde regarding elevation on Troponin and negative Marybel scan. Waiting for 
clearance from Dr. Campoverde, cardiologist. 

Per Devi, physical therapy, the patient is still unstable and recommended rehab placement 
for continuation of care. 

Notified to Dr. Collins and case management for SNF placement ordered. Carried out the order.

## 2019-07-24 NOTE — GI PROGRESS NOTE
Assessment/Plan


Problems:  


(1) Gastroenteritis


ICD Codes:  K52.9 - Noninfective gastroenteritis and colitis, unspecified


SNOMED:  51636228


(2) Elevated troponin


ICD Codes:  R74.8 - Abnormal levels of other serum enzymes


SNOMED:  842212013, 747171811, 923000340


(3) Abdominal pain


ICD Codes:  R10.9 - Unspecified abdominal pain


SNOMED:  10790344


(4) Pneumonia


ICD Codes:  J18.9 - Pneumonia, unspecified organism


SNOMED:  368056738


(5) SOB (shortness of breath)


ICD Codes:  R06.02 - Shortness of breath


SNOMED:  693859425


Status:  unchanged


Status Narrative


Discussed with Dr. Luna.


Assessment/Plan


Assessment


- resolved abd pain


- resolving N/V


- resolved diarrhea


- possibly viral gastroenteritis


- sudden spike in LFTs, normal on admission





Recommendations


- advance diet 


- symptomatic treatment


- IV and p.o. hydration plus electrolyte correction


- follow up cardiology recs


- avoid hepatotoxics


-We will need follow-up with PCP to monitor LFT





The patient was seen and examined at bedside and all new and available data was 

reviewed in the patients chart. I agree with the above findings, impression 

and plan.  (Patient seen earlier today. Signature stamp does not reflect 

patient encounter time.). - Ramesh Luna MD





Subjective


Subjective


No recurrent vomiting


generalized pain





Objective





Last 24 Hour Vital Signs








  Date Time  Temp Pulse Resp B/P (MAP) Pulse Ox O2 Delivery O2 Flow Rate FiO2


 


7/24/19 08:31  89 20  100 Room Air  21


 


7/24/19 08:27  87 22  98 Room Air  21


 


7/24/19 08:26  87 22  98 Room Air  21


 


7/24/19 08:00 97.9 92 20 109/76 (87) 95   


 


7/24/19 04:00 97.7 101 20 119/90 (100) 95   


 


7/24/19 03:24  87      


 


7/24/19 00:00 97.7 90 20 127/78 (94) 95   


 


7/23/19 23:29  92      


 


7/23/19 21:00      Room Air  


 


7/23/19 20:02  86      


 


7/23/19 20:00 97.7 91 20 106/79 (88) 92   


 


7/23/19 16:00  84      


 


7/23/19 16:00 98.1 99 18 117/62 (80) 99   


 


7/23/19 12:00 98.5 96 18 105/66 (79) 98   


 


7/23/19 12:00  86      

















Intake and Output  


 


 7/23/19 7/24/19





 19:00 07:00


 


Intake Total 540 ml 


 


Output Total  400 ml


 


Balance 540 ml -400 ml


 


  


 


Intake Oral 540 ml 


 


Output Urine Total  400 ml


 


# Voids 4 3


 


# Bowel Movements 1 











Laboratory Tests








Test


  7/24/19


06:08


 


White Blood Count


  4.7 K/UL


(4.8-10.8)  L


 


Red Blood Count


  4.75 M/UL


(4.20-5.40)


 


Hemoglobin


  14.3 G/DL


(12.0-16.0)


 


Hematocrit


  42.4 %


(37.0-47.0)


 


Mean Corpuscular Volume 89 FL (80-99)  


 


Mean Corpuscular Hemoglobin


  30.0 PG


(27.0-31.0)


 


Mean Corpuscular Hemoglobin


Concent 33.6 G/DL


(32.0-36.0)


 


Red Cell Distribution Width


  10.6 %


(11.6-14.8)  L


 


Platelet Count


  240 K/UL


(150-450)


 


Mean Platelet Volume


  7.3 FL


(6.5-10.1)


 


Neutrophils (%) (Auto)


  65.4 %


(45.0-75.0)


 


Lymphocytes (%) (Auto)


  14.1 %


(20.0-45.0)  L


 


Monocytes (%) (Auto)


  17.5 %


(1.0-10.0)  H


 


Eosinophils (%) (Auto)


  1.8 %


(0.0-3.0)


 


Basophils (%) (Auto)


  1.1 %


(0.0-2.0)


 


Sodium Level


  135 MMOL/L


(136-145)  L


 


Potassium Level


  3.5 MMOL/L


(3.5-5.1)


 


Chloride Level


  103 MMOL/L


()


 


Carbon Dioxide Level


  22 MMOL/L


(21-32)


 


Anion Gap


  11 mmol/L


(5-15)


 


Blood Urea Nitrogen


  13 mg/dL


(7-18)


 


Creatinine


  0.5 MG/DL


(0.55-1.30)  L


 


Estimat Glomerular Filtration


Rate > 60 mL/min


(>60)


 


Glucose Level


  100 MG/DL


()


 


Calcium Level


  8.0 MG/DL


(8.5-10.1)  L


 


Total Bilirubin


  0.7 MG/DL


(0.2-1.0)


 


Aspartate Amino Transf


(AST/SGOT) 601 U/L


(15-37)  H


 


Alanine Aminotransferase


(ALT/SGPT) 543 U/L


(12-78)  H


 


Alkaline Phosphatase


  323 U/L


()  H


 


Total Protein


  7.0 G/DL


(6.4-8.2)


 


Albumin


  2.7 G/DL


(3.4-5.0)  L


 


Globulin 4.3 g/dL  


 


Albumin/Globulin Ratio


  0.6 (1.0-2.7)


L








Height (Feet):  5


Height (Inches):  0.00


Weight (Pounds):  141


General Appearance:  WD/WN, no apparent distress, alert


Cardiovascular:  normal rate


Respiratory/Chest:  normal breath sounds, no respiratory distress


Abdominal Exam:  normal bowel sounds, non tender, soft


Extremities:  normal range of motion, non-tender


Objective


legally blind











Louis Gaytan NP Jul 24, 2019 10:00

## 2019-07-24 NOTE — NUR
NURSE NOTES:

Received report from GOLRY Sin. The patient is resting on the bed without acute distress or 
shortness of breath. The patient still has elevated Troponin but negative result on [facility] 
scan. The patient's bed in the lowest position, call light in reach, and fall and aspiration 
precaution reinforced. Will continue plan of care.

## 2019-07-24 NOTE — NUR
CASE MANAGEMENT:REVIEW



7/24/19

SI: NSTEMI

97.7   87  20  119/90  95% ON RA

AST/ALT+601/543



IS: ASA PO QD

LEVAQUIN PO QD

HEPARIN SQ Q12

IV ZOFRAN Q6HRS PRN

**: TELEMETRY STATUS

DCP: FROM HOME



PLAN:

DISCHARGE PLANNING FOR HOME WITH "[facility]" ~ FAXED

CLEARED BY CARDIOLOGIST FOR DISCHARGE

## 2019-07-24 NOTE — NUR
NURSE NOTES:

Informed Charissa, the , regarding change in case management consult from home 
with home health to SNF placement for further care. Will wait for response from Charissa. 
Will continue plan of care.

## 2019-07-25 VITALS — SYSTOLIC BLOOD PRESSURE: 103 MMHG | DIASTOLIC BLOOD PRESSURE: 71 MMHG

## 2019-07-25 VITALS — SYSTOLIC BLOOD PRESSURE: 103 MMHG | DIASTOLIC BLOOD PRESSURE: 76 MMHG

## 2019-07-25 VITALS — SYSTOLIC BLOOD PRESSURE: 117 MMHG | DIASTOLIC BLOOD PRESSURE: 77 MMHG

## 2019-07-25 VITALS — DIASTOLIC BLOOD PRESSURE: 69 MMHG | SYSTOLIC BLOOD PRESSURE: 109 MMHG

## 2019-07-25 VITALS — SYSTOLIC BLOOD PRESSURE: 106 MMHG | DIASTOLIC BLOOD PRESSURE: 69 MMHG

## 2019-07-25 LAB
ADD MANUAL DIFF: NO
ALBUMIN SERPL-MCNC: 3 G/DL (ref 3.4–5)
ALP SERPL-CCNC: 334 U/L (ref 46–116)
ALT SERPL-CCNC: 571 U/L (ref 12–78)
ANION GAP SERPL CALC-SCNC: 10 MMOL/L (ref 5–15)
AST SERPL-CCNC: 560 U/L (ref 15–37)
BASOPHILS NFR BLD AUTO: 0.6 % (ref 0–2)
BILIRUB DIRECT SERPL-MCNC: 0.2 MG/DL (ref 0–0.3)
BILIRUB SERPL-MCNC: 0.6 MG/DL (ref 0.2–1)
BUN SERPL-MCNC: 16 MG/DL (ref 7–18)
CALCIUM SERPL-MCNC: 8.4 MG/DL (ref 8.5–10.1)
CHLORIDE SERPL-SCNC: 102 MMOL/L (ref 98–107)
CO2 SERPL-SCNC: 24 MMOL/L (ref 21–32)
CREAT SERPL-MCNC: 0.5 MG/DL (ref 0.55–1.3)
EOSINOPHIL NFR BLD AUTO: 1.9 % (ref 0–3)
ERYTHROCYTE [DISTWIDTH] IN BLOOD BY AUTOMATED COUNT: 10.5 % (ref 11.6–14.8)
HCT VFR BLD CALC: 42.3 % (ref 37–47)
HGB BLD-MCNC: 14.4 G/DL (ref 12–16)
LYMPHOCYTES NFR BLD AUTO: 15.5 % (ref 20–45)
MCV RBC AUTO: 89 FL (ref 80–99)
MONOCYTES NFR BLD AUTO: 16.1 % (ref 1–10)
NEUTROPHILS NFR BLD AUTO: 66 % (ref 45–75)
PLATELET # BLD: 227 K/UL (ref 150–450)
POTASSIUM SERPL-SCNC: 3.7 MMOL/L (ref 3.5–5.1)
RBC # BLD AUTO: 4.76 M/UL (ref 4.2–5.4)
SODIUM SERPL-SCNC: 136 MMOL/L (ref 136–145)
WBC # BLD AUTO: 4 K/UL (ref 4.8–10.8)

## 2019-07-25 RX ADMIN — HEPARIN SODIUM SCH UNITS: 5000 INJECTION INTRAVENOUS; SUBCUTANEOUS at 08:24

## 2019-07-25 RX ADMIN — DOCUSATE SODIUM SCH MG: 100 CAPSULE, LIQUID FILLED ORAL at 08:23

## 2019-07-25 RX ADMIN — ASPIRIN 81 MG SCH MG: 81 TABLET ORAL at 08:23

## 2019-07-25 RX ADMIN — Medication SCH DROP: at 08:23

## 2019-07-25 RX ADMIN — PROMETHAZINE HYDROCHLORIDE PRN ML: 6.25 SOLUTION ORAL at 05:04

## 2019-07-25 NOTE — NUR
RD ASSESSMENT & RECOMMENDATIONS

SEE CARE ACTIVITY FOR COMPLETE ASSESSMENT



DAILY ESTIMATED NEEDS:

Needs based on Cardiac 50kg adj  

25-30  kcals/kg 

3123-9291  total kcals

1-1.2  g protein/kg

50-60  g total protein 

25-30  mL/kg

6600-0123  total fluid mLs



NUTRITION DIAGNOSIS:

Self feeding difficulty r/t paralysis and blindness as evidenced by L side

paralysis, BL blindness, pt requires assistance at all meals.



CURRENT DIET: Regular-> NPO for abd US    





PO DIET RECOMMENDATIONS:

LOW NA DIET 





ADDITIONAL RECOMMENDATIONS:

1) Weekly weights on calibrated bed scale  

2) Change diet to LOW NA w/ current elev LFT's  

3) 1:1 w/ all meals + snacks

## 2019-07-25 NOTE — NUR
***DISCHARGE PLANNING

[facility] IS INTERESTED IN ACCEPTING THIS PATIENT

THEY ARE CURRENTLY REQUESTING AUTHORIZATION FROM [facility]

PROVIDED TREVON AT [facility] WITH DIRECT PHONE NUMBER FOR [facility]

## 2019-07-25 NOTE — NUR
NURSE NOTES:

patient discharged as ordered. patient  accommodated with her boyfriend and her boyfriend's 
sister.no belonging. vital signs stable. no c/o pain and SOB.

## 2019-07-25 NOTE — PULMONOLOGY PROGRESS NOTE
Assessment/Plan


Problems:  


(1) Non-ST elevation (NSTEMI) myocardial infarction


(2) Blindness of both eyes


(3) Gastroenteritis


(4) Elevated troponin


Assessment/Plan


stress study was negative


symptomatic treatment


dc home if ok with cardiology





Subjective


ROS Limited/Unobtainable:  No


Constitutional:  Reports: no symptoms


HEENT:  Repors: no symptoms


Respiratory:  Reports: no symptoms


Allergies:  


Coded Allergies:  


     No Known Allergies (Unverified , 7/19/19)





Objective





Last 24 Hour Vital Signs








  Date Time  Temp Pulse Resp B/P (MAP) Pulse Ox O2 Delivery O2 Flow Rate FiO2


 


7/25/19 09:00      Room Air  


 


7/25/19 09:00 97.7 91 16 103/76 (85) 94   


 


7/25/19 04:00 99.0 101 21 108/77 (87) 95   


 


7/25/19 00:00 98.7 93 17 106/69 (81) 95   


 


7/24/19 21:00      Room Air  


 


7/24/19 20:00 97.7 94 16 117/70 (86) 96   


 


7/24/19 16:00 98.3 97 20 110/70 (83) 98   


 


7/24/19 12:00 98.3 87 20 111/74 (86) 96   


 


7/24/19 12:00  81      

















Intake and Output  


 


 7/24/19 7/25/19





 19:00 07:00


 


Intake Total 300 ml 240 ml


 


Balance 300 ml 240 ml


 


  


 


Intake Oral 300 ml 240 ml


 


# Voids 4 3








General Appearance:  WD/WN


HEENT:  normocephalic, atraumatic


Respiratory/Chest:  chest wall non-tender, lungs clear


Cardiovascular:  normal peripheral pulses, normal rate


Abdomen:  soft, non tender, no organomegaly


Genitourinary:  normal external genitalia


Skin:  no rash


Laboratory Tests


7/25/19 06:15: 


White Blood Count 4.0L, Red Blood Count 4.76, Hemoglobin 14.4, Hematocrit 42.3, 

Mean Corpuscular Volume 89, Mean Corpuscular Hemoglobin 30.3, Mean Corpuscular 

Hemoglobin Concent 34.1, Red Cell Distribution Width 10.5L, Platelet Count 227, 

Mean Platelet Volume 7.8, Neutrophils (%) (Auto) 66.0, Lymphocytes (%) (Auto) 

15.5L, Monocytes (%) (Auto) 16.1H, Eosinophils (%) (Auto) 1.9, Basophils (%) (

Auto) 0.6, Sodium Level 136, Potassium Level 3.7, Chloride Level 102, Carbon 

Dioxide Level 24, Anion Gap 10, Blood Urea Nitrogen 16, Creatinine 0.5L, 

Estimat Glomerular Filtration Rate > 60, Glucose Level 109H, Calcium Level 8.4L

, Total Bilirubin 0.6, Direct Bilirubin 0.2, Aspartate Amino Transf (AST/SGOT) 

560H, Alanine Aminotransferase (ALT/SGPT) 571H, Alkaline Phosphatase 334H, 

Total Protein 6.9, Albumin 3.0L





Current Medications








 Medications


  (Trade)  Dose


 Ordered  Sig/Rosalio


 Route


 PRN Reason  Start Time


 Stop Time Status Last Admin


Dose Admin


 


 Acetaminophen


  (Tylenol)  650 mg  Q4H  PRN


 ORAL


 Mild Pain/Temp > 100.5  7/25/19 11:00


 8/18/19 10:59   


 


 


 Aspirin


  (ASA)  81 mg  DAILY


 ORAL


   7/26/19 09:00


 8/21/19 12:29   


 


 


 Dextrose


  (Dextrose 50%)  25 ml  Q30M  PRN


 IV


 Hypoglycemia  7/25/19 10:15


 8/18/19 17:44   


 


 


 Dextrose


  (Dextrose 50%)  50 ml  Q30M  PRN


 IV


 Hypoglycemia  7/25/19 10:15


 8/18/19 17:44   


 


 


 Docusate Sodium


  (Colace)  100 mg  TWICE A  DAY


 ORAL


   7/25/19 18:00


 8/21/19 17:59   


 


 


 Heparin Sodium


  (Porcine)


  (Heparin 5000


 units/ml)  5,000 units  EVERY 12  HOURS


 SUBQ


   7/25/19 21:00


 8/18/19 20:59   


 


 


 Ketotifen Fumarate


  (Zatidor)  1 drop  Q12HR


 BOTH EYES


   7/25/19 21:00


 8/23/19 20:59   


 


 


 Morphine Sulfate


  (Morphine


 Sulfate)  2 mg  Q4H  PRN


 IVP


 Severe Pain (Pain Scale 7-10)  7/25/19 11:00


 7/26/19 10:59   


 


 


 Ondansetron HCl


  (Zofran)  4 mg  Q6H  PRN


 IVP


 Nausea & Vomiting  7/25/19 11:45


 8/18/19 17:44   


 


 


 Patient Own


 Medication


  (Patient's Own


 Med)  2 ea  Q4HR


 BOTH EYES


   7/25/19 13:00


 8/24/19 12:59   


 


 


 Polyethylene


 Glycol


  (Miralax)  17 gm  DAILYPRN  PRN


 ORAL


 Constipation  7/25/19 11:00


 8/18/19 10:59   


 


 


 Promethazine HCl/


 Codeine


  (Phenergan with


 Codeine)  5 ml  Q6H  PRN


 ORAL


 For Cough  7/25/19 11:00


 8/21/19 10:59   


 

















Gerald Aguilar MD Jul 25, 2019 11:24

## 2019-07-25 NOTE — NUR
NURSE NOTES:

Dr. Aguilar came and saw the patient. Per Dr. Aguilar, the patient will be discharged home 
today and she can follow up the outpatient clinic for elevated liver enzymes. Informed the 
 to update the discharge placement.

## 2019-07-25 NOTE — NUR
TRANSFER TO FLOOR:

Patient transferred to Med- Surg, per Dr. Aguilar's order.   Report given to GLORY Wood.  
Belongings and medications given to GLORY Wood. Family informed of transfer. Patient 
transferred in stable condition.

## 2019-07-25 NOTE — NUR
NURSE NOTES:

Received report from GLORY Flores. Patient in bed asleep showing no signs of acute distress. 
Respiration even and non labored on room air. No sob noted. VS stable. Observed pt. with 
bilateral blindness. Call light within reach. Commode on bedside. IV on right hand SL patent 
and intact. Bed in lowest position, wheels locked and alarm on. Endorsed pt. to be 
transferred to [facility], waiting for empty bed. Will continue plan of care.

## 2019-07-25 NOTE — NUR
CASE MANAGEMENT:REVIEW



7/25/19

SI: NSTEMI. PNA

LEGALLY BLIND. LT SIDE PARALYSIS

97.7   91  16  103/76  94% ON RA





IS: ZATIDOR BOTH EYES Q12

TYLENOL PO Q4HRS PRN

ASA PO QD

HEPARIN SQ Q12

IV MORPHINE Q4HRS PRN

**: TELEMETRY STATUS

DCP: FROM HOME



PLAN:

DISCHARGE PLANNING FOR HOME WITH "[facility]" ~ FAXED

CLEARED BY CARDIOLOGIST FOR DISCHARGE

## 2019-07-25 NOTE — NUR
NURSE NOTES:

Received report from GLORY Matthews. Patient is resting in bed, sleeping in semi-mayen's 
position. No signs of acute distress at this moment. Respiration even and non labored on 
room air. Bed in lowest position, side rails up x2, wheels locked. Call light and bed side 
table within reach. Will continue plan of care.

## 2019-07-25 NOTE — GENERAL PROGRESS NOTE
Assessment/Plan


Status:  unchanged


Assessment/Plan:


Problem List:  


(1) Elevated troponin


ICD Codes:  R74.8 - Abnormal levels of other serum enzymes


SNOMED:  700430516, 597820754, 805326595


(2) SOB (shortness of breath)


ICD Codes:  R06.02 - Shortness of breath


SNOMED:  335438511


(3) Pneumonia


ICD Codes:  J18.9 - Pneumonia, unspecified organism


SNOMED:  571871068


(4) Abdominal pain


ICD Codes:  R10.9 - Unspecified abdominal pain


SNOMED:  04564771


--> Improved abdominal pain


--> stable for dc today





Subjective


Constitutional:  Denies: no symptoms, chills, diaphoresis, fever, malaise, 

weakness, other


HEENT:  Denies: no symptoms, eye pain, blurred vision, tearing, double vision, 

ear pain, ear discharge, nose pain, nose congestion, throat pain, throat 

swelling, mouth pain, mouth swelling, other


Cardiovascular:  Denies: no symptoms, chest pain, edema, irregular heart rate, 

lightheadedness, palpitations, syncope, other


Respiratory:  Denies: no symptoms, cough, orthopnea, shortness of breath, SOB 

with excertion, SOB at rest, sputum, stridor, wheezing, other


Gastrointestinal/Abdominal:  Denies: no symptoms, abdomen distended, abdominal 

pain, black stools, tarry stools, blood in stool, constipated, diarrhea, 

difficulty swallowing, nausea, poor appetite, poor fluid intake, rectal bleeding

, vomiting, other


Neurologic/Psychiatric:  Denies: no symptoms, anxiety, depressed, emotional 

problems, headache, numbness, paresthesia, pre-existing deficit, seizure, 

tingling, tremors, weakness, other


Endocrine:  Denies: no symptoms, excessive sweating, flushing, intolerance to 

cold, intolerance to heat, increased hunger, increased thirst, increased urine, 

unexplained weight gain, unexplained weight loss, other


Allergies:  


Coded Allergies:  


     No Known Allergies (Unverified , 7/19/19)


Subjective


7/25: no events, no f/c, potential dc today





Objective





Last 24 Hour Vital Signs








  Date Time  Temp Pulse Resp B/P (MAP) Pulse Ox O2 Delivery O2 Flow Rate FiO2


 


7/25/19 11:40 97.1 94 19 103/71 (82) 94   


 


7/25/19 09:00      Room Air  


 


7/25/19 09:00 97.7 91 16 103/76 (85) 94   


 


7/25/19 04:00 99.0 101 21 108/77 (87) 95   


 


7/25/19 00:00 98.7 93 17 106/69 (81) 95   


 


7/24/19 21:00      Room Air  


 


7/24/19 20:00 97.7 94 16 117/70 (86) 96   


 


7/24/19 16:00 98.3 97 20 110/70 (83) 98   

















Intake and Output  


 


 7/24/19 7/25/19





 19:00 07:00


 


Intake Total 300 ml 240 ml


 


Balance 300 ml 240 ml


 


  


 


Intake Oral 300 ml 240 ml


 


# Voids 4 3








Laboratory Tests


7/25/19 06:15: 


White Blood Count 4.0L, Red Blood Count 4.76, Hemoglobin 14.4, Hematocrit 42.3, 

Mean Corpuscular Volume 89, Mean Corpuscular Hemoglobin 30.3, Mean Corpuscular 

Hemoglobin Concent 34.1, Red Cell Distribution Width 10.5L, Platelet Count 227, 

Mean Platelet Volume 7.8, Neutrophils (%) (Auto) 66.0, Lymphocytes (%) (Auto) 

15.5L, Monocytes (%) (Auto) 16.1H, Eosinophils (%) (Auto) 1.9, Basophils (%) (

Auto) 0.6, Sodium Level 136, Potassium Level 3.7, Chloride Level 102, Carbon 

Dioxide Level 24, Anion Gap 10, Blood Urea Nitrogen 16, Creatinine 0.5L, 

Estimat Glomerular Filtration Rate > 60, Glucose Level 109H, Calcium Level 8.4L

, Total Bilirubin 0.6, Direct Bilirubin 0.2, Aspartate Amino Transf (AST/SGOT) 

560H, Alanine Aminotransferase (ALT/SGPT) 571H, Alkaline Phosphatase 334H, 

Total Protein 6.9, Albumin 3.0L


Height (Feet):  5


Height (Inches):  0.00


Weight (Pounds):  141


Objective


General Appearance:  WD/WN, no apparent distress, alert


Cardiovascular:  normal rate


Respiratory/Chest:  normal breath sounds, no respiratory distress


Abdominal Exam:  normal bowel sounds, non tender, soft


Extremities:  non-tender


Objective legally blind











Kalpesh Flores MD Jul 25, 2019 14:23

## 2019-07-25 NOTE — GI PROGRESS NOTE
Assessment/Plan


Problems:  


(1) Gastroenteritis


ICD Codes:  K52.9 - Noninfective gastroenteritis and colitis, unspecified


SNOMED:  10998954


(2) Elevated troponin


ICD Codes:  R74.8 - Abnormal levels of other serum enzymes


SNOMED:  261184713, 398254336, 012865988


(3) Abdominal pain


ICD Codes:  R10.9 - Unspecified abdominal pain


SNOMED:  64370071


(4) Pneumonia


ICD Codes:  J18.9 - Pneumonia, unspecified organism


SNOMED:  497649957


(5) SOB (shortness of breath)


ICD Codes:  R06.02 - Shortness of breath


SNOMED:  789425912


(6) Abnormal LFTs


ICD Codes:  R94.5 - Abnormal results of liver function studies


SNOMED:  140346725


Status:  unchanged


Status Narrative


Discussed with Dr. Luna.


Assessment/Plan


Assessment


- resolved abd pain


- resolving N/V


- resolved diarrhea


- possibly viral gastroenteritis


- sudden spike in LFTs, normal on admission





Recommendations


- advance diet 


- symptomatic treatment


- IV and p.o. hydration plus electrolyte correction


- follow up cardiology recs


- avoid hepatotoxics


- US ordered 


- trend LFTs, will need follow-up with PCP to monitor enzymes.





The patient was seen and examined at bedside and all new and available data was 

reviewed in the patients chart. I agree with the above findings, impression 

and plan.  (Patient seen earlier today. Signature stamp does not reflect 

patient encounter time.). - Ramesh Luna MD





Subjective


Subjective


No recurrent vomiting


denies any pain today





Objective





Last 24 Hour Vital Signs








  Date Time  Temp Pulse Resp B/P (MAP) Pulse Ox O2 Delivery O2 Flow Rate FiO2


 


7/25/19 09:00      Room Air  


 


7/25/19 09:00 97.7 91 16 103/76 (85) 94   


 


7/25/19 04:00 99.0 101 21 108/77 (87) 95   


 


7/25/19 00:00 98.7 93 17 106/69 (81) 95   


 


7/24/19 21:00      Room Air  


 


7/24/19 20:00 97.7 94 16 117/70 (86) 96   


 


7/24/19 16:00 98.3 97 20 110/70 (83) 98   


 


7/24/19 12:00 98.3 87 20 111/74 (86) 96   


 


7/24/19 12:00  81      

















Intake and Output  


 


 7/24/19 7/25/19





 19:00 07:00


 


Intake Total 300 ml 240 ml


 


Balance 300 ml 240 ml


 


  


 


Intake Oral 300 ml 240 ml


 


# Voids 4 3











Laboratory Tests








Test


  7/25/19


06:15


 


White Blood Count


  4.0 K/UL


(4.8-10.8)  L


 


Red Blood Count


  4.76 M/UL


(4.20-5.40)


 


Hemoglobin


  14.4 G/DL


(12.0-16.0)


 


Hematocrit


  42.3 %


(37.0-47.0)


 


Mean Corpuscular Volume 89 FL (80-99)  


 


Mean Corpuscular Hemoglobin


  30.3 PG


(27.0-31.0)


 


Mean Corpuscular Hemoglobin


Concent 34.1 G/DL


(32.0-36.0)


 


Red Cell Distribution Width


  10.5 %


(11.6-14.8)  L


 


Platelet Count


  227 K/UL


(150-450)


 


Mean Platelet Volume


  7.8 FL


(6.5-10.1)


 


Neutrophils (%) (Auto)


  66.0 %


(45.0-75.0)


 


Lymphocytes (%) (Auto)


  15.5 %


(20.0-45.0)  L


 


Monocytes (%) (Auto)


  16.1 %


(1.0-10.0)  H


 


Eosinophils (%) (Auto)


  1.9 %


(0.0-3.0)


 


Basophils (%) (Auto)


  0.6 %


(0.0-2.0)


 


Sodium Level


  136 MMOL/L


(136-145)


 


Potassium Level


  3.7 MMOL/L


(3.5-5.1)


 


Chloride Level


  102 MMOL/L


()


 


Carbon Dioxide Level


  24 MMOL/L


(21-32)


 


Anion Gap


  10 mmol/L


(5-15)


 


Blood Urea Nitrogen


  16 mg/dL


(7-18)


 


Creatinine


  0.5 MG/DL


(0.55-1.30)  L


 


Estimat Glomerular Filtration


Rate > 60 mL/min


(>60)


 


Glucose Level


  109 MG/DL


()  H


 


Calcium Level


  8.4 MG/DL


(8.5-10.1)  L


 


Total Bilirubin


  0.6 MG/DL


(0.2-1.0)


 


Direct Bilirubin


  0.2 MG/DL


(0.0-0.3)


 


Aspartate Amino Transf


(AST/SGOT) 560 U/L


(15-37)  H


 


Alanine Aminotransferase


(ALT/SGPT) 571 U/L


(12-78)  H


 


Alkaline Phosphatase


  334 U/L


()  H


 


Total Protein


  6.9 G/DL


(6.4-8.2)


 


Albumin


  3.0 G/DL


(3.4-5.0)  L








Height (Feet):  5


Height (Inches):  0.00


Weight (Pounds):  141


General Appearance:  WD/WN, no apparent distress, alert


Cardiovascular:  normal rate


Respiratory/Chest:  normal breath sounds, no respiratory distress


Abdominal Exam:  normal bowel sounds, non tender, soft


Extremities:  non-tender


Objective


legally blind











Louis Gaytan NP Jul 25, 2019 10:39

## 2019-07-25 NOTE — INFECTIOUS DISEASES PROG NOTE
Assessment/Plan


Assessment/Plan





Assessment:


Abd pain, Nausea


 Recent food poisoning vs viral gastroenteritis


PNA (+cough)


  -CT abd/p: The visualized lung bases to the scattered patchy pulmonary 

parenchymal infiltrates, most likely pneumonia. Limited assessment of the GI 

tract, due to lack of enteric contrast administration. No definite acute 

abdominal or pelvic process. Nonspecific mild endometrial thickening and fluid. 

Consider pelvic sonographic correlation as clinically indicated


 Possible collapsed left ovarian follicle. Mild superior endplate depression of 

the L1 vertebral body. Age indeterminate. Consider MRI for better 

characterization if clinically relevant


 Subcentimeter low-attenuation renal lesions, too small to characterize, most 

likely benign simple cortical cysts. No further follow-up necessary.





Afebrile


no leukocytosis


   -CXR: No definite acute process


   -u/a neg





Elevated troponins





legally blindness


L side paralysis





HIV ab screen neg





Plan:


-Continue to monitor off abx


    -7/23 SP Levaquin #4


    -7/20 SP IV Vancomycin #2, Cefepime #2\


    -7/19 SP Ceftriaxone x1





-f/u cx


-Monitor CBC/CMP, temperatures








Thank you for this consultation. Will continue to follow along with you.





Discussed with RN.





Subjective


Allergies:  


Coded Allergies:  


     No Known Allergies (Unverified , 7/19/19)


Subjective


afebrile


no leukocytosis


at 2l NC





Objective


Vital Signs





Last 24 Hour Vital Signs








  Date Time  Temp Pulse Resp B/P (MAP) Pulse Ox O2 Delivery O2 Flow Rate FiO2


 


7/25/19 09:00      Room Air  


 


7/25/19 09:00 97.7 91 16 103/76 (85) 94   


 


7/25/19 04:00 99.0 101 21 108/77 (87) 95   


 


7/25/19 00:00 98.7 93 17 106/69 (81) 95   


 


7/24/19 21:00      Room Air  


 


7/24/19 20:00 97.7 94 16 117/70 (86) 96   


 


7/24/19 16:00 98.3 97 20 110/70 (83) 98   


 


7/24/19 12:00 98.3 87 20 111/74 (86) 96   


 


7/24/19 12:00  81      








Height (Feet):  5


Height (Inches):  0.00


Weight (Pounds):  141


Objective


GENERAL:  Calm in bed, O2 NC, slight short of breath.  No


complaint.


CARDIOVASCULAR:  No murmur.


LUNGS:  Distant and clear.


ABDOMEN:  Bowel sounds positive.  Nontender.  Nondistended.


EXTREMITIES:  No cyanosis, clubbing, or edema.  Left hand contracture


noted.


NEUROLOGIC:  The patient moves all extremities, slightly weak.





Laboratory Tests








Test


  7/25/19


06:15


 


White Blood Count


  4.0 K/UL


(4.8-10.8)  L


 


Red Blood Count


  4.76 M/UL


(4.20-5.40)


 


Hemoglobin


  14.4 G/DL


(12.0-16.0)


 


Hematocrit


  42.3 %


(37.0-47.0)


 


Mean Corpuscular Volume 89 FL (80-99)  


 


Mean Corpuscular Hemoglobin


  30.3 PG


(27.0-31.0)


 


Mean Corpuscular Hemoglobin


Concent 34.1 G/DL


(32.0-36.0)


 


Red Cell Distribution Width


  10.5 %


(11.6-14.8)  L


 


Platelet Count


  227 K/UL


(150-450)


 


Mean Platelet Volume


  7.8 FL


(6.5-10.1)


 


Neutrophils (%) (Auto)


  66.0 %


(45.0-75.0)


 


Lymphocytes (%) (Auto)


  15.5 %


(20.0-45.0)  L


 


Monocytes (%) (Auto)


  16.1 %


(1.0-10.0)  H


 


Eosinophils (%) (Auto)


  1.9 %


(0.0-3.0)


 


Basophils (%) (Auto)


  0.6 %


(0.0-2.0)


 


Sodium Level


  136 MMOL/L


(136-145)


 


Potassium Level


  3.7 MMOL/L


(3.5-5.1)


 


Chloride Level


  102 MMOL/L


()


 


Carbon Dioxide Level


  24 MMOL/L


(21-32)


 


Anion Gap


  10 mmol/L


(5-15)


 


Blood Urea Nitrogen


  16 mg/dL


(7-18)


 


Creatinine


  0.5 MG/DL


(0.55-1.30)  L


 


Estimat Glomerular Filtration


Rate > 60 mL/min


(>60)


 


Glucose Level


  109 MG/DL


()  H


 


Calcium Level


  8.4 MG/DL


(8.5-10.1)  L


 


Total Bilirubin


  0.6 MG/DL


(0.2-1.0)


 


Direct Bilirubin


  0.2 MG/DL


(0.0-0.3)


 


Aspartate Amino Transf


(AST/SGOT) 560 U/L


(15-37)  H


 


Alanine Aminotransferase


(ALT/SGPT) 571 U/L


(12-78)  H


 


Alkaline Phosphatase


  334 U/L


()  H


 


Total Protein


  6.9 G/DL


(6.4-8.2)


 


Albumin


  3.0 G/DL


(3.4-5.0)  L











Current Medications








 Medications


  (Trade)  Dose


 Ordered  Sig/Rosalio


 Route


 PRN Reason  Start Time


 Stop Time Status Last Admin


Dose Admin


 


 Acetaminophen


  (Tylenol)  650 mg  Q4H  PRN


 ORAL


 Mild Pain/Temp > 100.5  7/25/19 11:00


 8/18/19 10:59   


 


 


 Aspirin


  (ASA)  81 mg  DAILY


 ORAL


   7/26/19 09:00


 8/21/19 12:29   


 


 


 Dextrose


  (Dextrose 50%)  25 ml  Q30M  PRN


 IV


 Hypoglycemia  7/25/19 10:15


 8/18/19 17:44   


 


 


 Dextrose


  (Dextrose 50%)  50 ml  Q30M  PRN


 IV


 Hypoglycemia  7/25/19 10:15


 8/18/19 17:44   


 


 


 Docusate Sodium


  (Colace)  100 mg  TWICE A  DAY


 ORAL


   7/25/19 18:00


 8/21/19 17:59   


 


 


 Heparin Sodium


  (Porcine)


  (Heparin 5000


 units/ml)  5,000 units  EVERY 12  HOURS


 SUBQ


   7/25/19 21:00


 8/18/19 20:59   


 


 


 Ketotifen Fumarate


  (Zatidor)  1 drop  Q12HR


 BOTH EYES


   7/25/19 21:00


 8/23/19 20:59   


 


 


 Morphine Sulfate


  (Morphine


 Sulfate)  2 mg  Q4H  PRN


 IVP


 Severe Pain (Pain Scale 7-10)  7/25/19 11:00


 7/26/19 10:59   


 


 


 Ondansetron HCl


  (Zofran)  4 mg  Q6H  PRN


 IVP


 Nausea & Vomiting  7/25/19 11:45


 8/18/19 17:44   


 


 


 Patient Own


 Medication


  (Patient's Own


 Med)  2 ea  Q4HR


 BOTH EYES


   7/25/19 13:00


 8/24/19 12:59   


 


 


 Polyethylene


 Glycol


  (Miralax)  17 gm  DAILYPRN  PRN


 ORAL


 Constipation  7/25/19 11:00


 8/18/19 10:59   


 


 


 Promethazine HCl/


 Codeine


  (Phenergan with


 Codeine)  5 ml  Q6H  PRN


 ORAL


 For Cough  7/25/19 11:00


 8/21/19 10:59   


 

















Erica Meraz M.D. Jul 25, 2019 10:25

## 2019-07-25 NOTE — NUR
*-* INSURANCE *-*



ALL CLINICALS HAVE BEEN FAXED TO:



[facility] WILL TRACK THIS ADMIT

PLEASE FAX THE REVIEW/CLINICAL

P- [phone]

F- [phone]...REVIEW/CLINICAL

-------------------------------------------------------------------------------



[facility]: [facility]

T: [phone] 

REF# [id number]

## 2019-07-29 NOTE — DISCHARGE SUMMARY
Discharge Summary


Discharge Summary


_


DATE OF ADMISSION: 7/19/2019





DATE OF DISCHARGE: 7/25/2019








DISCHARGED BY: Dr. Collins





REASON FOR ADMISSION: 


49 years old female with past medical history of bilateral blindness, left 

upper extremity paralysis,  presented with diffuse abdominal pain with 

associated diarrhea , severe nausea and inability to tolerate oral diet.  


During evaluation patient was noted to have elevated troponin -0.338.  ProBNP 

122. 


Chest x-ray revealed no acute cardiopulmonary pathology.


Patient admitted for abdominal pain and elevated troponin


 


CONSULTANTS:


cardiologist Dr. Campoverde


pulmonary/critical care Dr. Aguilar 


ID specialist Dr. Meraz


GI specialist  [facility] COURSE: 


Patient initially admitted to telemetry floor.


Serial troponin revealed pattern of mild elevation 0.52 and 0.73.   


Patient was on antiplatelet therapy with aspirin.


EKG revealed normal sinus rhythm with nonspecific T wave abnormality.


Echocardiogram demonstrated normal left ventricular chamber size, systolic 

function and wall motion.  Ejection fraction estimated to be 50 to 55% with 

mild left ventricular hypertrophy.


Right ventricular systolic pressure of 7.


Patient subsequently undergone myocardial perfusion scan test, which was 

nonischemic.


Per cardiologist, elevated troponin was likely due to infectious process.  


Patient remained in sinus rhythm.


GI specialist followed.  


CT of the abdomen and pelvis demonstrated no definite acute abdominal or pelvic 

process. 


The visualized lung bases revealed scattered patchy pulmonary parenchymal 

infiltrates, most likely pneumonia.


Patient initially was on the IV fluids and kept n.p.o.


Pain management was addressed as needed.


Antiemetic provided as needed.


Symptomatic treatment provided.


Diarrhea, nausea, vomiting were slowly resolving.  


Patient started on liquid diet, which was gradually advanced.  Patient was able 

to tolerate diet.  


Patient noted sudden sudden spike in LFTs , which were normal on admission.  


LFT trending down.  


HIV test was nonreactive.  


Patient to follow-up with her primary care provider to monitor LFT.  

Recommended to avoid hepatotoxic .  


ID specialist followed.  


Patient  had recent food poisoning , probable viral gastroenteritis.  Symptoms 

resolved prior to discharge.


Patient complained of cough.  


CT of the abdomen and pelvis revealed evidence of scattered infiltrates, likely 

pneumonia.  


Patient completed antibiotic treatment for pneumonia as per ID specialist 

recommendation.


Antitussive provided as needed.


Supplemental oxygen provided as needed to keep pulse oximetry above 92%.  Pulse 

oximetry was stable on room air.  


Pulmonary toilet was on standby as needed.  


DVT prophylaxis provided.  


Home eyedrops continued.


Patient remained afebrile, no leukocytosis.


Patient clinically stabilized and was ready for discharge . 





FINAL DIAGNOSES: 


Abdominal pain with nausea and diarrhea,  likely viral gastroenteritis


Recent food poisoning


Pneumonia,  status post treatment


Elevated troponin


Abnormal LFT


Bilateral blindness





DISCHARGE MEDICATIONS:


See Medication Reconciliation list.





DISCHARGE INSTRUCTIONS:


Patient was discharged home . 


Follow up with primary care provider in one week  for LFT check.





I have been assigned to dictate discharge summary for this account. 


I was not involved in the patient's management.











Solange Lund NP Jul 29, 2019 07:57

## 2019-07-29 NOTE — NUR
*-* INSURANCE *-*



DISCHARGE SUMMARY HAS BEEN FAXED TO:





ARUN HENRY: [facility]

T: [phone] 

REF# [id number]

## 2023-05-19 NOTE — NUR
NURSE NOTES:

Received report from Cherelle HOLDER from tele. Pt transferred from tele to  via hospital bed 
with RN. AOX4 and able to make needs known. 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 No 
c/o pain at this time. BNo acute distress noted.  Belonging list reviewed and updated. IV 
site Rhand 20G SL intact and asymptomatic. Noted Right elbow scratch due to friction from 
the bed per patient and no any other skin breakdown noted. Pt on the diaper due to current 
period. Bed in the lowest position and locked. Call light's in pt's hand. Bilateral side 
rails up for safety. Will continue to plan of care. 